# Patient Record
Sex: FEMALE | Race: WHITE | HISPANIC OR LATINO | Employment: UNEMPLOYED | URBAN - METROPOLITAN AREA
[De-identification: names, ages, dates, MRNs, and addresses within clinical notes are randomized per-mention and may not be internally consistent; named-entity substitution may affect disease eponyms.]

---

## 2023-06-27 LAB
ALANINE AMINOTRANSFERASE (SGPT) (U/L) IN SER/PLAS: 16 U/L (ref 7–45)
ALBUMIN (G/DL) IN SER/PLAS: 4.5 G/DL (ref 3.4–5)
ALKALINE PHOSPHATASE (U/L) IN SER/PLAS: 89 U/L (ref 33–110)
ASPARTATE AMINOTRANSFERASE (SGOT) (U/L) IN SER/PLAS: 17 U/L (ref 9–39)
BASOPHILS (10*3/UL) IN BLOOD BY AUTOMATED COUNT: 0.06 X10E9/L (ref 0–0.1)
BASOPHILS/100 LEUKOCYTES IN BLOOD BY AUTOMATED COUNT: 0.7 % (ref 0–2)
BILIRUBIN DIRECT (MG/DL) IN SER/PLAS: 0.1 MG/DL (ref 0–0.3)
BILIRUBIN TOTAL (MG/DL) IN SER/PLAS: 0.2 MG/DL (ref 0–1.2)
C REACTIVE PROTEIN (MG/L) IN SER/PLAS: 1.39 MG/DL
COMPLEMENT C3 (MG/DL) IN SER/PLAS: 156 MG/DL (ref 87–200)
COMPLEMENT C4 (MG/DL) IN SER/PLAS: 33 MG/DL (ref 10–50)
CREATINE KINASE (U/L) IN SER/PLAS: 72 U/L (ref 0–215)
CREATININE (MG/DL) IN SER/PLAS: 0.67 MG/DL (ref 0.5–1.05)
EOSINOPHILS (10*3/UL) IN BLOOD BY AUTOMATED COUNT: 0.25 X10E9/L (ref 0–0.7)
EOSINOPHILS/100 LEUKOCYTES IN BLOOD BY AUTOMATED COUNT: 3.1 % (ref 0–6)
ERYTHROCYTE DISTRIBUTION WIDTH (RATIO) BY AUTOMATED COUNT: 12.4 % (ref 11.5–14.5)
ERYTHROCYTE MEAN CORPUSCULAR HEMOGLOBIN CONCENTRATION (G/DL) BY AUTOMATED: 32.6 G/DL (ref 32–36)
ERYTHROCYTE MEAN CORPUSCULAR VOLUME (FL) BY AUTOMATED COUNT: 94 FL (ref 80–100)
ERYTHROCYTES (10*6/UL) IN BLOOD BY AUTOMATED COUNT: 4.07 X10E12/L (ref 4–5.2)
GFR FEMALE: >90 ML/MIN/1.73M2
HEMATOCRIT (%) IN BLOOD BY AUTOMATED COUNT: 38.4 % (ref 36–46)
HEMOGLOBIN (G/DL) IN BLOOD: 12.5 G/DL (ref 12–16)
IMMATURE GRANULOCYTES/100 LEUKOCYTES IN BLOOD BY AUTOMATED COUNT: 0.2 % (ref 0–0.9)
LEUKOCYTES (10*3/UL) IN BLOOD BY AUTOMATED COUNT: 8.1 X10E9/L (ref 4.4–11.3)
LYMPHOCYTES (10*3/UL) IN BLOOD BY AUTOMATED COUNT: 1.75 X10E9/L (ref 1.2–4.8)
LYMPHOCYTES/100 LEUKOCYTES IN BLOOD BY AUTOMATED COUNT: 21.7 % (ref 13–44)
MONOCYTES (10*3/UL) IN BLOOD BY AUTOMATED COUNT: 0.54 X10E9/L (ref 0.1–1)
MONOCYTES/100 LEUKOCYTES IN BLOOD BY AUTOMATED COUNT: 6.7 % (ref 2–10)
NEUTROPHILS (10*3/UL) IN BLOOD BY AUTOMATED COUNT: 5.46 X10E9/L (ref 1.2–7.7)
NEUTROPHILS/100 LEUKOCYTES IN BLOOD BY AUTOMATED COUNT: 67.6 % (ref 40–80)
PLATELETS (10*3/UL) IN BLOOD AUTOMATED COUNT: 342 X10E9/L (ref 150–450)
PROTEIN TOTAL: 7.6 G/DL (ref 6.4–8.2)
SEDIMENTATION RATE, ERYTHROCYTE: 31 MM/H (ref 0–30)
UREA NITROGEN (MG/DL) IN SER/PLAS: 13 MG/DL (ref 6–23)

## 2023-10-11 PROBLEM — K29.40: Status: RESOLVED | Noted: 2023-10-11 | Resolved: 2023-10-11

## 2023-10-11 PROBLEM — K21.9 GASTROESOPHAGEAL REFLUX DISEASE WITHOUT ESOPHAGITIS: Chronic | Status: ACTIVE | Noted: 2019-09-18

## 2023-10-11 PROBLEM — R10.13 EPIGASTRIC PAIN: Status: ACTIVE | Noted: 2023-10-11

## 2023-10-11 PROBLEM — F41.9 ANXIETY AND DEPRESSION: Status: ACTIVE | Noted: 2023-10-11

## 2023-10-11 PROBLEM — F32.A ANXIETY AND DEPRESSION: Status: ACTIVE | Noted: 2023-10-11

## 2023-10-11 PROBLEM — I10 BENIGN HYPERTENSION: Status: ACTIVE | Noted: 2023-10-11

## 2023-10-11 PROBLEM — K29.40: Status: ACTIVE | Noted: 2023-10-11

## 2023-10-11 PROBLEM — K44.9 HIATAL HERNIA: Status: ACTIVE | Noted: 2023-10-11

## 2023-10-11 RX ORDER — PANTOPRAZOLE SODIUM 40 MG/1
TABLET, DELAYED RELEASE ORAL 2 TIMES DAILY
COMMUNITY
Start: 2020-04-06

## 2023-10-11 RX ORDER — SUCRALFATE 1 G/1
TABLET ORAL 4 TIMES DAILY
COMMUNITY
Start: 2020-05-01

## 2023-10-11 RX ORDER — ASPIRIN 81 MG/1
TABLET ORAL
COMMUNITY
Start: 2019-09-18

## 2023-10-11 RX ORDER — AMLODIPINE BESYLATE 10 MG/1
10 TABLET ORAL DAILY
COMMUNITY

## 2023-10-11 NOTE — PROGRESS NOTES
GENERAL SURGERY/TRAUMA  HISTORY AND PHYSICAL/CONSULT    Date: 10/11/2023 Time: 8:42 AM    Name: Sonja Carrera  MRN: [id number]    This is a 52 y.o. female presenting virtually today for follow up hiatal hernia with chronic GERD. Last seen by me 11/13/2020, at that time patient was advised to call the office if wanting to proceed with surgical intervention vs medical management. Patient had a recent follow up with GI specialist, Dr. Neida Taylor and was referred back to discuss proceeding with hiatal hernia repair for ongoing symptoms with use of omeprazole and carafate daily.     HPI from 11/13/2020:   Discussed her results at length   has small sliding hiatal hernia   pH study off meds positive for moderate reflux and sxs correlation   EGD c/w small hiatal hernia   manometry with good esophageal motility       CT negative for intraabdominal pathology to explain right sided subcostal pain, HIDA scan negative , US negative for gallstones       She continues to have GERD despite pantoprazole, Carafate and Pepto-Bismol, although meds help with sxs       I discussed continuing medical management vs fixing hiatal hernia       TIF and Lap hiatal hernia repair with fundoplication discussed       would favor TIF in light of only small sliding hiatal hernia and GERD       explained atypical sxs or sxs unrelated to hiatal hernia may and most likely will persist       she is getting cardiology assessment in Dec.       She will call us if she wants to proceed with any of interventions after she has discussed it with her  and has cardiology assessment done.           PAST MEDICAL HISTORY:  Past Medical History:   Diagnosis Date    AG (atrophic gastritis)     Chronic epigastric pain     Chronic gastroesophageal reflux disease     Hiatal hernia with GERD     HTN (hypertension)         PAST SURGICAL HISTORY:  No past surgical history on file.    FAMILY HISTORY:  No family history on file.     SOCIAL HISTORY:  Social History      Tobacco Use    Smoking status: Never    Smokeless tobacco: Never   Substance Use Topics    Alcohol use: Not Currently    Drug use: Never       MEDICATIONS:  Prior to Admission Medications:    Current Outpatient Medications:     amLODIPine (Norvasc) 10 mg tablet, Take 1 tablet (10 mg) by mouth once daily., Disp: , Rfl:     aspirin 81 mg EC tablet, Take by mouth., Disp: , Rfl:     pantoprazole (ProtoNix) 40 mg EC tablet, Take by mouth twice a day., Disp: , Rfl:     sucralfate (Carafate) 1 gram tablet, Take by mouth 4 times a day., Disp: , Rfl:     ALLERGIES:  Not on File    REVIEW OF SYSTEMS:  GENERAL: Negative for malaise, significant weight loss and fever  NECK: Negative for lumps, goiter, pain and significant neck swelling  RESPIRATORY: Negative for cough, wheezing or shortness of breath.  CARDIOVASCULAR: Negative for chest pain, leg swelling or palpitations.  GI: Negative for abdominal discomfort, blood in stools or black stools or change in bowel habits  : No history of dysuria, frequency or incontinence  MUSCULOSKELETAL: Negative for joint pain or swelling, back pain or muscle pain.  SKIN: Negative for lesions, rash, and itching.  PSYCH: Negative for sleep disturbance, mood disorder and recent psychosocial stressors.  ENDOCRINE: Negative for cold or heat intolerance, polyuria, polydipsia and goiter.    PHYSICAL EXAM:  There were no vitals taken for this visit.  Virtual visit  IMPRESSION:  Sonja Carrera is a 52 y.o. female with GERD, hiatal hernia    PLAN:    Previous assessment was in 2020  At that time patient had a small sliding hiatal hernia and significant reflux issues however there is no recent endoscopy or imaging studies which I can evaluate the size of the hernia.  We will order an upper GI study to assess the size of hiatal hernia and reflux as    She in my opinion still is a good candidate for TIF, endoscopic fundoplication unless imaging studies show a larger hiatal hernia in that case we may  consider laparoscopic hiatal hernia repair and fundoplication.  Patient understands it and wants to proceed with it as her symptoms are making her quality of life.  We will schedule for endoscopic/laparoscopic fundoplication depending on insurance approval.      The risks of the procedure including bleeding, infection, injury to neighboring organ, prolonged hospitalization, need for further procedure and death have been explained to the patient and Sonja Carrera has expressed understanding and acceptance of them. Consent has been signed.    Elisabeth Clifton MD  Bariatric and Minimally Invasive General Surgery

## 2023-10-12 ENCOUNTER — TELEMEDICINE (OUTPATIENT)
Dept: SURGERY | Facility: CLINIC | Age: 53
End: 2023-10-12
Payer: COMMERCIAL

## 2023-10-12 ENCOUNTER — TELEPHONE (OUTPATIENT)
Dept: SURGERY | Facility: CLINIC | Age: 53
End: 2023-10-12

## 2023-10-12 DIAGNOSIS — K44.9 HIATAL HERNIA: ICD-10-CM

## 2023-10-12 DIAGNOSIS — R10.13 EPIGASTRIC PAIN: ICD-10-CM

## 2023-10-12 DIAGNOSIS — K21.9 GASTROESOPHAGEAL REFLUX DISEASE WITHOUT ESOPHAGITIS: Chronic | ICD-10-CM

## 2023-10-12 DIAGNOSIS — K44.9 HIATAL HERNIA: Primary | ICD-10-CM

## 2023-10-12 PROCEDURE — 99213 OFFICE O/P EST LOW 20 MIN: CPT | Mod: 95 | Performed by: SURGERY

## 2023-10-12 PROCEDURE — 99213 OFFICE O/P EST LOW 20 MIN: CPT | Performed by: SURGERY

## 2023-10-12 RX ORDER — OXYBUTYNIN CHLORIDE 5 MG/1
5 TABLET, EXTENDED RELEASE ORAL DAILY
COMMUNITY
Start: 2023-03-07 | End: 2023-12-22 | Stop reason: ALTCHOICE

## 2023-10-12 RX ORDER — CYCLOBENZAPRINE HCL 10 MG
1 TABLET ORAL NIGHTLY
Status: ON HOLD | COMMUNITY
Start: 2023-07-09 | End: 2024-01-03 | Stop reason: ALTCHOICE

## 2023-10-12 RX ORDER — ALBUTEROL SULFATE 90 UG/1
AEROSOL, METERED RESPIRATORY (INHALATION)
COMMUNITY
Start: 2022-11-19

## 2023-10-12 RX ORDER — TERBINAFINE HYDROCHLORIDE 250 MG/1
250 TABLET ORAL DAILY
COMMUNITY
Start: 2023-06-05 | End: 2023-12-22 | Stop reason: ALTCHOICE

## 2023-10-12 RX ORDER — CYCLOBENZAPRINE HCL 10 MG
10 TABLET ORAL NIGHTLY
COMMUNITY
Start: 2023-09-24

## 2023-10-12 NOTE — TELEPHONE ENCOUNTER
Spoke with patient.  Sent the following follow up email:    Wolfgang Casillas!    Thank you so much for chatting with me today! As a reminder, please call our office at [phone] to let us know when you go for your x-ray.  The number for radiology scheduling is [phone].      Please sign the attached HIPAA Authorization so we can start the process to see if the TIF procedure is a covered benefit on your insurance plan.  This, with the information obtained from the x-ray, will let us know in which direction we can proceed for your procedure.     If you have any questions please do not hesitate to contact our office at the number above or via [employer]!     Thank you!   [facility]

## 2023-10-19 ENCOUNTER — HOSPITAL ENCOUNTER (OUTPATIENT)
Dept: RADIOLOGY | Facility: HOSPITAL | Age: 53
Discharge: HOME | End: 2023-10-19
Payer: COMMERCIAL

## 2023-10-19 DIAGNOSIS — K21.9 GASTROESOPHAGEAL REFLUX DISEASE WITHOUT ESOPHAGITIS: Chronic | ICD-10-CM

## 2023-10-19 DIAGNOSIS — K44.9 HIATAL HERNIA: ICD-10-CM

## 2023-10-19 PROCEDURE — 3430000001 HC RX 343 DIAGNOSTIC RADIOPHARMACEUTICALS: Performed by: SURGERY

## 2023-10-19 PROCEDURE — 2500000001 HC RX 250 WO HCPCS SELF ADMINISTERED DRUGS (ALT 637 FOR MEDICARE OP): Performed by: SURGERY

## 2023-10-19 PROCEDURE — 74246 X-RAY XM UPR GI TRC 2CNTRST: CPT | Performed by: RADIOLOGY

## 2023-10-19 PROCEDURE — 74240 X-RAY XM UPR GI TRC 1CNTRST: CPT

## 2023-10-19 RX ADMIN — BARIUM SULFATE 100 ML: 980 POWDER, FOR SUSPENSION ORAL at 09:09

## 2023-10-19 RX ADMIN — ANTACID/ANTIFLATULENT 1 PACKET: 380; 550; 10; 10 GRANULE, EFFERVESCENT ORAL at 09:10

## 2023-10-20 ENCOUNTER — TELEPHONE (OUTPATIENT)
Dept: SURGERY | Facility: CLINIC | Age: 53
End: 2023-10-20
Payer: COMMERCIAL

## 2023-10-20 NOTE — TELEPHONE ENCOUNTER
Left a message for the patient letting her know Dr. Clifton feels she should proceed with hiatal hernia repair with fundoplication either laparoscopic or via endoscope.  Explained to patient briefly the difference between the two and that we would need her to sign a HIPAA release form to proceed with financial clearance for the endoscopic route.  Asked patient to call me back to let me know which way she would like to proceed         Patient returned call and would like to proceed with the TIF if covered by insurance.  Email to patient with HIPAA auth form.     Hi Sonja!     As discussed here is the HIPAA form and an overview of the patient support program!    Please sign and return to me at your earliest convenience!     Thank you,   [facility] Olivas, YUMIN, RN   Assistant Nurse Manager   Bariatric and Metabolic Surgery   [address] [place] [address]   P: [phone] F: [phone]  Available via [employer] Secure Chat

## 2023-11-30 DIAGNOSIS — K21.00 GASTROESOPHAGEAL REFLUX DISEASE WITH ESOPHAGITIS WITHOUT HEMORRHAGE: Primary | ICD-10-CM

## 2023-12-04 PROBLEM — K21.00 GASTROESOPHAGEAL REFLUX DISEASE WITH ESOPHAGITIS WITHOUT HEMORRHAGE: Status: ACTIVE | Noted: 2023-11-30

## 2023-12-05 ENCOUNTER — HOSPITAL ENCOUNTER (OUTPATIENT)
Dept: CARDIOLOGY | Facility: HOSPITAL | Age: 53
Discharge: HOME | End: 2023-12-05
Payer: COMMERCIAL

## 2023-12-05 ENCOUNTER — HOSPITAL ENCOUNTER (OUTPATIENT)
Dept: RADIOLOGY | Facility: HOSPITAL | Age: 53
Discharge: HOME | End: 2023-12-05
Payer: COMMERCIAL

## 2023-12-05 DIAGNOSIS — K21.00 GASTROESOPHAGEAL REFLUX DISEASE WITH ESOPHAGITIS WITHOUT HEMORRHAGE: ICD-10-CM

## 2023-12-05 PROCEDURE — 93005 ELECTROCARDIOGRAM TRACING: CPT

## 2023-12-05 PROCEDURE — 71045 X-RAY EXAM CHEST 1 VIEW: CPT | Performed by: RADIOLOGY

## 2023-12-05 PROCEDURE — 71045 X-RAY EXAM CHEST 1 VIEW: CPT

## 2023-12-05 PROCEDURE — 93010 ELECTROCARDIOGRAM REPORT: CPT | Performed by: INTERNAL MEDICINE

## 2023-12-06 LAB
ATRIAL RATE: 71 BPM
P AXIS: 62 DEGREES
P OFFSET: 194 MS
P ONSET: 139 MS
PR INTERVAL: 160 MS
Q ONSET: 219 MS
QRS COUNT: 12 BEATS
QRS DURATION: 76 MS
QT INTERVAL: 392 MS
QTC CALCULATION(BAZETT): 425 MS
QTC FREDERICIA: 414 MS
R AXIS: 1 DEGREES
T AXIS: 57 DEGREES
T OFFSET: 415 MS
VENTRICULAR RATE: 71 BPM

## 2023-12-18 NOTE — H&P (VIEW-ONLY)
GENERAL SURGERY CLINIC  FOLLOW UP NOTE      Name: Sonja Carrera  MRN: [id number]      Index Surgery  Date of Surgery: 1/ 3/24  Surgeon: Dr. Elisabeth Clifton    Surgical Procedure: Hiatal hernia repair 54452    HPI: 53 year old female presenting today for a final pre op visit in advance of her hiatal hernia repair scheduled 1/3/24.      REVIEW OF SYSTEMS:  CONSTITUTIONAL: Patient denies fevers, chills, sweats and weight changes.  CARDIOVASCULAR: Patient denies chest pains, palpitations, orthopnea and paroxysmal nocturnal dyspnea.  RESPIRATORY: No dyspnea on exertion, no wheezing or cough.  GI: No nausea, vomiting, diarrhea, constipation, abdominal pain, hematochezia or melena.  MUSCULOSKELETAL: No myalgias or arthralgias.  NEUROLOGIC: No chronic headaches, no seizures. Patient denies numbness, tingling or weakness.  PSYCHIATRIC: Patient denies problems with mood disturbance. No problems with anxiety.  ENDOCRINE: No excessive urination or excessive thirst.  DERMATOLOGIC: Patient denies any rashes or skin changes.    PHYSICAL EXAM:  There were no vitals taken for this visit.  Alert, well appearing, no acute distress, nourished, hydrated.  Anicteric sclera, no ptosis  Facial symmetry  Neck supple  Unlabored respirations.  Easily conversant without increased respiratory effort  Oriented to person, place, time.  Judgement intact.  Appropriate mood, affect.       ASSESSMENT & PLAN:  53 y.o. female presenting for a final pre op visit for a HHR ,fundoplication and associated procedures scheduled 1/3/24    Patient is suffering from reflux  Extensive work-up has been done  Endoscopy is consistent with small hiatal hernia  Insurance refused TIF, Patient is interested in proceeding with surgical fix  Hiatal hernia repair with fundoplication discussed  Risk of bleeding, leak, perioperative cardiopulmonary complication, dysphagia, GERD, worsening symptoms, recurrent symptoms, recurrent hernia, wrap migration, need for endoscopic  interventions, need for revisions in future, vagus nerve injury, diarrhea, bloating, inability to belch or vomit etc. were discussed all questions were answered.  Patient wants to proceed.  Complete versus partial fundoplication discussed and will decide intraoperatively.      Will proceed as planned.

## 2023-12-18 NOTE — PATIENT INSTRUCTIONS
You are scheduled for a: Hiatal Hernia Repair  with Dr. Clifton on 1/3/2024    YOU DO NOT NEED TO DO A BOWEL PREP.  You will be on clear liquids only starting the day prior to surgery. Please refer to your final pre op book/RD instructions.     You will receive a phone call the day prior to surgery with your OR arrival time.  Be sure to read over your Pre-Op book for final preparation for surgery.  Make a shopping list and  your supplements prior to surgery.     Prescriptions for Omeprazole (antacid), Oxycodone (pain),  and Ondansetron (anti-Nausea) have been sent to your retail pharmacy. Pick these up if you have not yet done so - these are for after surgery.      Call with any questions! [phone] for [facility].

## 2023-12-18 NOTE — PROGRESS NOTES
GENERAL SURGERY CLINIC  FOLLOW UP NOTE      Name: Sonja Carrera  MRN: [id number]      Index Surgery  Date of Surgery: 1/ 3/24  Surgeon: Dr. Elisabeth Clifton    Surgical Procedure: Hiatal hernia repair 11419    HPI: 53 year old female presenting today for a final pre op visit in advance of her hiatal hernia repair scheduled 1/3/24.      REVIEW OF SYSTEMS:  CONSTITUTIONAL: Patient denies fevers, chills, sweats and weight changes.  CARDIOVASCULAR: Patient denies chest pains, palpitations, orthopnea and paroxysmal nocturnal dyspnea.  RESPIRATORY: No dyspnea on exertion, no wheezing or cough.  GI: No nausea, vomiting, diarrhea, constipation, abdominal pain, hematochezia or melena.  MUSCULOSKELETAL: No myalgias or arthralgias.  NEUROLOGIC: No chronic headaches, no seizures. Patient denies numbness, tingling or weakness.  PSYCHIATRIC: Patient denies problems with mood disturbance. No problems with anxiety.  ENDOCRINE: No excessive urination or excessive thirst.  DERMATOLOGIC: Patient denies any rashes or skin changes.    PHYSICAL EXAM:  There were no vitals taken for this visit.  Alert, well appearing, no acute distress, nourished, hydrated.  Anicteric sclera, no ptosis  Facial symmetry  Neck supple  Unlabored respirations.  Easily conversant without increased respiratory effort  Oriented to person, place, time.  Judgement intact.  Appropriate mood, affect.       ASSESSMENT & PLAN:  53 y.o. female presenting for a final pre op visit for a HHR ,fundoplication and associated procedures scheduled 1/3/24    Patient is suffering from reflux  Extensive work-up has been done  Endoscopy is consistent with small hiatal hernia  Insurance refused TIF, Patient is interested in proceeding with surgical fix  Hiatal hernia repair with fundoplication discussed  Risk of bleeding, leak, perioperative cardiopulmonary complication, dysphagia, GERD, worsening symptoms, recurrent symptoms, recurrent hernia, wrap migration, need for endoscopic  interventions, need for revisions in future, vagus nerve injury, diarrhea, bloating, inability to belch or vomit etc. were discussed all questions were answered.  Patient wants to proceed.  Complete versus partial fundoplication discussed and will decide intraoperatively.      Will proceed as planned.

## 2023-12-22 ENCOUNTER — OFFICE VISIT (OUTPATIENT)
Dept: SURGERY | Facility: CLINIC | Age: 53
End: 2023-12-22
Payer: COMMERCIAL

## 2023-12-22 ENCOUNTER — PRE-ADMISSION TESTING (OUTPATIENT)
Dept: PREADMISSION TESTING | Facility: HOSPITAL | Age: 53
End: 2023-12-22
Payer: COMMERCIAL

## 2023-12-22 ENCOUNTER — NUTRITION (OUTPATIENT)
Dept: SURGERY | Facility: CLINIC | Age: 53
End: 2023-12-22
Payer: COMMERCIAL

## 2023-12-22 VITALS
DIASTOLIC BLOOD PRESSURE: 79 MMHG | OXYGEN SATURATION: 100 % | HEIGHT: 63 IN | WEIGHT: 166 LBS | SYSTOLIC BLOOD PRESSURE: 127 MMHG | HEART RATE: 79 BPM | BODY MASS INDEX: 29.41 KG/M2

## 2023-12-22 VITALS
SYSTOLIC BLOOD PRESSURE: 146 MMHG | DIASTOLIC BLOOD PRESSURE: 84 MMHG | WEIGHT: 166.45 LBS | HEIGHT: 63 IN | RESPIRATION RATE: 20 BRPM | OXYGEN SATURATION: 99 % | TEMPERATURE: 95.5 F | HEART RATE: 78 BPM | BODY MASS INDEX: 29.49 KG/M2

## 2023-12-22 DIAGNOSIS — K21.00 GASTROESOPHAGEAL REFLUX DISEASE WITH ESOPHAGITIS WITHOUT HEMORRHAGE: ICD-10-CM

## 2023-12-22 DIAGNOSIS — G89.18 POST-OP PAIN: ICD-10-CM

## 2023-12-22 DIAGNOSIS — K21.9 GASTROESOPHAGEAL REFLUX DISEASE WITHOUT ESOPHAGITIS: Chronic | ICD-10-CM

## 2023-12-22 DIAGNOSIS — K44.9 HIATAL HERNIA: Primary | ICD-10-CM

## 2023-12-22 DIAGNOSIS — Z98.890 POST-OPERATIVE NAUSEA AND VOMITING: ICD-10-CM

## 2023-12-22 DIAGNOSIS — R11.2 POST-OPERATIVE NAUSEA AND VOMITING: ICD-10-CM

## 2023-12-22 DIAGNOSIS — R10.13 EPIGASTRIC PAIN: ICD-10-CM

## 2023-12-22 LAB
ABO GROUP (TYPE) IN BLOOD: NORMAL
ALBUMIN SERPL BCP-MCNC: 4.5 G/DL (ref 3.4–5)
ALP SERPL-CCNC: 84 U/L (ref 33–110)
ALT SERPL W P-5'-P-CCNC: 23 U/L (ref 7–45)
ANION GAP SERPL CALC-SCNC: 12 MMOL/L (ref 10–20)
ANTIBODY SCREEN: NORMAL
AST SERPL W P-5'-P-CCNC: 18 U/L (ref 9–39)
BILIRUB SERPL-MCNC: 0.3 MG/DL (ref 0–1.2)
BUN SERPL-MCNC: 14 MG/DL (ref 6–23)
CALCIUM SERPL-MCNC: 9.5 MG/DL (ref 8.6–10.3)
CHLORIDE SERPL-SCNC: 103 MMOL/L (ref 98–107)
CO2 SERPL-SCNC: 28 MMOL/L (ref 21–32)
CREAT SERPL-MCNC: 0.83 MG/DL (ref 0.5–1.05)
ERYTHROCYTE [DISTWIDTH] IN BLOOD BY AUTOMATED COUNT: 12.9 % (ref 11.5–14.5)
GFR SERPL CREATININE-BSD FRML MDRD: 83 ML/MIN/1.73M*2
GLUCOSE SERPL-MCNC: 124 MG/DL (ref 74–99)
HCT VFR BLD AUTO: 39.1 % (ref 36–46)
HGB BLD-MCNC: 12.3 G/DL (ref 12–16)
INR PPP: 1 (ref 0.9–1.1)
MCH RBC QN AUTO: 30.5 PG (ref 26–34)
MCHC RBC AUTO-ENTMCNC: 31.5 G/DL (ref 32–36)
MCV RBC AUTO: 97 FL (ref 80–100)
NRBC BLD-RTO: 0 /100 WBCS (ref 0–0)
PLATELET # BLD AUTO: 370 X10*3/UL (ref 150–450)
POTASSIUM SERPL-SCNC: 4.4 MMOL/L (ref 3.5–5.3)
PROT SERPL-MCNC: 7 G/DL (ref 6.4–8.2)
PROTHROMBIN TIME: 11.5 SECONDS (ref 9.8–12.8)
RBC # BLD AUTO: 4.03 X10*6/UL (ref 4–5.2)
RH FACTOR (ANTIGEN D): NORMAL
SODIUM SERPL-SCNC: 139 MMOL/L (ref 136–145)
WBC # BLD AUTO: 8.5 X10*3/UL (ref 4.4–11.3)

## 2023-12-22 PROCEDURE — 85610 PROTHROMBIN TIME: CPT

## 2023-12-22 PROCEDURE — 99214 OFFICE O/P EST MOD 30 MIN: CPT | Performed by: SURGERY

## 2023-12-22 PROCEDURE — 3078F DIAST BP <80 MM HG: CPT | Performed by: SURGERY

## 2023-12-22 PROCEDURE — 80053 COMPREHEN METABOLIC PANEL: CPT

## 2023-12-22 PROCEDURE — 99203 OFFICE O/P NEW LOW 30 MIN: CPT | Performed by: NURSE PRACTITIONER

## 2023-12-22 PROCEDURE — 36415 COLL VENOUS BLD VENIPUNCTURE: CPT

## 2023-12-22 PROCEDURE — 85027 COMPLETE CBC AUTOMATED: CPT

## 2023-12-22 PROCEDURE — 1036F TOBACCO NON-USER: CPT | Performed by: SURGERY

## 2023-12-22 PROCEDURE — 3074F SYST BP LT 130 MM HG: CPT | Performed by: SURGERY

## 2023-12-22 PROCEDURE — 86900 BLOOD TYPING SEROLOGIC ABO: CPT

## 2023-12-22 RX ORDER — ONDANSETRON 4 MG/1
4 TABLET, ORALLY DISINTEGRATING ORAL EVERY 6 HOURS PRN
Qty: 60 TABLET | Refills: 1 | Status: SHIPPED | OUTPATIENT
Start: 2023-12-22

## 2023-12-22 RX ORDER — OXYCODONE HCL 5 MG/5 ML
5 SOLUTION, ORAL ORAL EVERY 6 HOURS PRN
Qty: 140 ML | Refills: 0 | Status: SHIPPED | OUTPATIENT
Start: 2023-12-22 | End: 2024-01-04 | Stop reason: HOSPADM

## 2023-12-22 RX ORDER — OMEPRAZOLE 40 MG/1
40 CAPSULE, DELAYED RELEASE ORAL
Qty: 30 CAPSULE | Refills: 0 | Status: SHIPPED | OUTPATIENT
Start: 2023-12-22 | End: 2024-01-21

## 2023-12-22 ASSESSMENT — DUKE ACTIVITY SCORE INDEX (DASI)
CAN YOU DO HEAVY WORK AROUND THE HOUSE LIKE SCRUBBING FLOORS OR LIFTING AND MOVING HEAVY FURNITURE: YES
CAN YOU DO LIGHT WORK AROUND THE HOUSE LIKE DUSTING OR WASHING DISHES: YES
CAN YOU RUN A SHORT DISTANCE: YES
CAN YOU DO YARD WORK LIKE RAKING LEAVES, WEEDING OR PUSHING A MOWER: YES
CAN YOU PARTICIPATE IN STRENOUS SPORTS LIKE SWIMMING, SINGLES TENNIS, FOOTBALL, BASKETBALL, OR SKIING: NO
DASI METS SCORE: 8.2
CAN YOU WALK INDOORS, SUCH AS AROUND YOUR HOUSE: YES
TOTAL_SCORE: 44.7
CAN YOU WALK A BLOCK OR TWO ON LEVEL GROUND: YES
CAN YOU PARTICIPATE IN MODERATE RECREATIONAL ACTIVITIES LIKE GOLF, BOWLING, DANCING, DOUBLES TENNIS OR THROWING A BASEBALL OR FOOTBALL: NO
CAN YOU HAVE SEXUAL RELATIONS: YES
CAN YOU DO MODERATE WORK AROUND THE HOUSE LIKE VACUUMING, SWEEPING FLOORS OR CARRYING GROCERIES: YES
CAN YOU TAKE CARE OF YOURSELF (EAT, DRESS, BATHE, OR USE TOILET): YES
CAN YOU CLIMB A FLIGHT OF STAIRS OR WALK UP A HILL: YES

## 2023-12-22 ASSESSMENT — PAIN SCALES - GENERAL: PAINLEVEL_OUTOF10: 0 - NO PAIN

## 2023-12-22 ASSESSMENT — PAIN - FUNCTIONAL ASSESSMENT: PAIN_FUNCTIONAL_ASSESSMENT: 0-10

## 2023-12-22 NOTE — PREPROCEDURE INSTRUCTIONS
Medication List            Accurate as of December 22, 2023  2:22 PM. Always use your most recent med list.                albuterol 90 mcg/actuation inhaler  Medication Adjustments for Surgery: Take morning of surgery with sip of water, no other fluids     amLODIPine 10 mg tablet  Commonly known as: Norvasc  Medication Adjustments for Surgery: Take morning of surgery with sip of water, no other fluids     aspirin 81 mg EC tablet  Medication Adjustments for Surgery: Continue until night before surgery     * cyclobenzaprine 10 mg tablet  Commonly known as: Flexeril  Medication Adjustments for Surgery: Other (Comment)     * cyclobenzaprine 10 mg tablet  Commonly known as: Flexeril  Medication Adjustments for Surgery: Other (Comment)     omeprazole 40 mg DR capsule  Commonly known as: PriLOSEC  Take 1 capsule (40 mg) by mouth once daily in the morning. Take before meals. Do not crush or chew. Open capsule, sprinkle beads on SF jello, pudding or applesauce.  Medication Adjustments for Surgery: Other (Comment)     ondansetron ODT 4 mg disintegrating tablet  Commonly known as: Zofran-ODT  Take 1 tablet (4 mg) by mouth every 6 hours if needed for nausea or vomiting.  Medication Adjustments for Surgery: Other (Comment)     oxyCODONE 5 mg/5 mL solution  Commonly known as: Roxicodone  Take 5 mL (5 mg) by mouth every 6 hours if needed for severe pain (7 - 10) or moderate pain (4 - 6) for up to 7 days.  Medication Adjustments for Surgery: Other (Comment)     pantoprazole 40 mg EC tablet  Commonly known as: ProtoNix  Medication Adjustments for Surgery: Continue until night before surgery     sucralfate 1 gram tablet  Commonly known as: Carafate  Medication Adjustments for Surgery: Continue until night before surgery           * This list has 2 medication(s) that are the same as other medications prescribed for you. Read the directions carefully, and ask your doctor or other care provider to review them with you.                                   NPO Instructions:    Do not eat any food after midnight the night before your surgery/procedure.    Additional Instructions:     Day of Surgery:  Wear  comfortable loose fitting clothing  Do not use moisturizers, creams, lotions or perfume  All jewelry and valuables should be left at home        PRE-OPERATIVE INSTRUCTIONS FOR SURGERY    *Do not eat anything after midnight the night of surgery.  This includes food of any kind (including hard candy, cough drops, mints and gum) and beverages (including coffee and soda).  You may drink up to one 8 ounce glass of water up until three hours before your scheduled surgery time.    *One of our staff members will call you ONE business day before your surgery, between 11a-2p  To let you know the time to arrive.  If you have no received a call by 2 pm, call [phone]  *When you arrive at the hospital-->GO TO Registration on the ground floor  *Stop smoking 24 hours prior to surgery.  No Marijuana, CBD Oil or Vaping for 48 hours  *No alcohol 24 hours prior to surgery  *You will need a responsible adult to drive you home  -No acrylic nails or nail polish on at least one fingernail, NO polish on toes for foot surgery  -You may be asked to remove your dentures, partial plate, eyeglasses or contact lenses before going to surgery.  Please bring a case for these items.  -Body piercings need to be removed.  Jewelry and valuables should be left at home.  -Put on loose,  comfortable, clean clothing, that will accommodate bandages    HOME PREOPERATIVE ANTIBACTERIAL SHOWER:  -This shower is a way of cleaning the skin with germ killing solution before surgery.  The solution contains Chorhexidine (CHG).   Let your DrMike Know if you are allergic to Chlorhexidine.    NIGHT BEFORE SURGERY:  IF you are having a TOTAL joint replacement- YOU WILL SHOWER 2 NIGHTS PRIOR to surgery    -Take a normal shower and wash your hair  -Turn OFF water to avoid rinsing the antibacterial  skin cleanser off (CHG).  -Apply the CHG cleanser to a clean and wet washcloth.  Lather your entire body from the neck down.    -DO NOT USE ON THE HEAD, FACE, or GENITALS.  -RINSE immediately if CHG is in contact with your eyes, ears or mouth  -Gently wash your body.  Have the CHG cleanser stay on your skin for 3 MINUTES.  -After 3 minutes, turn on water and rinse the CHG cleanser off your body completely  -DO NOT WASH with regular soap after using CHG.  -PAT DRY with a clean fresh towel  -DO NOT apply any love, deodorants or lotions  -Dress in clean night cloths  **CHG cleanser will cause stains to fabrics if you wash them with bleach after use.     DAY OF SURGERY:  -Take shower-->JUST GET WET.  Turn OFF water.  -REPEAT the CHG cleanse as you did the night before.  -PAT DRY-->    What you may be asked to bring to surgery:  ___Crutches, walker  ___CPAP machine  ___Urine specimen

## 2023-12-22 NOTE — CPM/PAT H&P
"CPM/PAT Evaluation       Name: Sonja Carrera (Sonja Carrera)  /Age: 1970/53 y.o.     In-Person       Chief Complaint: Hiatal hernia    53 yr old female with c/o hiatal hernia.  Reports ongoing years long progressive acid reflux symptoms including severe heartburn before and after meals.  Reports nausea with some 'puke burps' feeling acid up in mouth which happens \"quite frequently\".  Had tried medications, diet and position changes (sitting upright even with sleep) with no lasting relief.  Used to enjoy yoga but now cannot do so, due to bending body worsens symptoms.   Sought treatment and found to have hiatal hernia.  Reports remaining otherwise physically active, denies cardiac or respiratory symptoms.  Denies past issues with anesthesia.         Past Medical History:   Diagnosis Date    AG (atrophic gastritis)     Arthritis     Asthma     Chronic epigastric pain     Chronic gastroesophageal reflux disease     Clotting disorder (CMS/HCC)     Hiatal hernia with GERD     HTN (hypertension)     Vision loss        Past Surgical History:   Procedure Laterality Date    BLADDER SURGERY      CARPAL TUNNEL RELEASE Bilateral     COLONOSCOPY      HYSTERECTOMY      UPPER GASTROINTESTINAL ENDOSCOPY         Patient Sexual activity questions deferred to the physician.    Family History   Problem Relation Name Age of Onset    Heart disease Father         Allergies   Allergen Reactions    Gabapentin Unknown     Neck swelling and sore throat    Ibuprofen Unknown     Chest pain    Pregabalin Unknown     Neck swelling and sore throat    Sulfamethoxazole-Trimethoprim Shortness of breath    Amitriptyline Swelling    Duloxetine Hcl Headache    Tizanidine Dizziness    Amoxicillin-Pot Clavulanate Unknown    Egg Yolk Unknown    Tetanus Toxoid Unknown     She can't walk and she also has flu like symptoms.    Tetracycline Unknown    Erythromycin Unknown and Rash    Fluocinolone Rash    Methocarbamol Rash    Wellbutrin [Bupropion Hcl] Rash "       Prior to Admission medications    Medication Sig Start Date End Date Taking? Authorizing Provider   albuterol 90 mcg/actuation inhaler INHALE TWO PUFFS BY MOUTH EVERY 4 HOURS AS NEEDED as instructed for wheezing/shortness of breath 11/19/22   Historical Provider, MD   amLODIPine (Norvasc) 10 mg tablet Take 1 tablet (10 mg) by mouth once daily.    Historical Provider, MD   aspirin 81 mg EC tablet Take by mouth. 9/18/19   Historical Provider, MD   cyclobenzaprine (Flexeril) 10 mg tablet Take 1 tablet (10 mg) by mouth once daily at bedtime. 7/9/23   Historical Provider, MD   cyclobenzaprine (Flexeril) 10 mg tablet Take 1 tablet (10 mg) by mouth once daily at bedtime. 9/24/23   Historical Provider, MD   omeprazole (PriLOSEC) 40 mg DR capsule Take 1 capsule (40 mg) by mouth once daily in the morning. Take before meals. Do not crush or chew. Open capsule, sprinkle beads on SF jello, pudding or applesauce. 12/22/23 1/21/24  Elisabeth Clifton MD   ondansetron ODT (Zofran-ODT) 4 mg disintegrating tablet Take 1 tablet (4 mg) by mouth every 6 hours if needed for nausea or vomiting. 12/22/23   Elisabeth Clifton MD   oxybutynin XL (Ditropan-XL) 5 mg 24 hr tablet Take 1 tablet (5 mg) by mouth once daily. 3/7/23   Historical Provider, MD   oxyCODONE (Roxicodone) 5 mg/5 mL solution Take 5 mL (5 mg) by mouth every 6 hours if needed for severe pain (7 - 10) or moderate pain (4 - 6) for up to 7 days. 12/22/23 12/29/23  Elisabeth Clifton MD   pantoprazole (ProtoNix) 40 mg EC tablet Take by mouth twice a day. 4/6/20   Historical Provider, MD   sucralfate (Carafate) 1 gram tablet Take by mouth 4 times a day. 5/1/20   Historical Provider, MD   terbinafine (LamISIL) 250 mg tablet Take 1 tablet (250 mg) by mouth once daily. 6/5/23   Historical Provider, MD        Review of Systems    Constitutional: no fever, no chills and not feeling poorly.   Eyes: no eyesight problems.   ENT: no hearing loss, no nosebleeds and no sore throat.    Cardiovascular: no chest pain, no palpitations and no extremity edema.   Respiratory: no shortness of breath, no wheezing, no cough and no sob with exertion.   Gastrointestinal: negative for abdominal pain, blood in stools or changes in bowel habits   Genitourinary: negative for dysuria, incontinence or changes in urinary habits   Musculoskeletal: no arthralgias and no gait abnormality.   Integumentary: negative for lesions, rash or itching.   Neurological: negative for confusion, dizziness, fainting or difficulty walking.   Psychiatric: not suicidal, no anxiety and no depression.   All other systems have been reviewed and are negative for complaint.     Physical Exam    General:      No acute distress, AOx3; appropriate mood and eye contact  HENT:      Head normal cephalic; mucous membranes moist & pink  Eyes:      Vision grossly intact, PERRLA, corrective lenses in use  Skin:      Warm, dry & intact, no erythema or rashes noted  Respiratory:      Symmetric chest expansion, CTA, Room air  Cardiovascular:      RRR, S1, S2, no murmurs, rubs or gallops  Abdomen:      Soft, non-tender, BS+  Extremities:      No gross deformities; GIANG x4; normal gait  Psychiatric:      Pleasant & cooperative, appropriate affect    PAT AIRWAY:   Airway:     Mallampati::  II    TM distance::  >3 FB    Neck ROM::  Limited   Permanent Right upper   partials      Visit Vitals  /84   Pulse 78   Temp 35.3 °C (95.5 °F) (Temporal)   Resp 20       DASI Risk Score      Flowsheet Row Most Recent Value   DASI SCORE 44.7   METS Score (Will be calculated only when all the questions are answered) 8.2          Caprini DVT Assessment    No data to display       Modified Frailty Index    No data to display       CHADS2 Stroke Risk  Current as of 4 minutes ago        N/A 3 - 100%: High Risk   2 - 3%: Medium Risk   0 - 2%: Low Risk     Last Change: N/A          This score determines the patient's risk of having a stroke if the patient has atrial  fibrillation.        This score is not applicable to this patient. Components are not calculated.          Revised Cardiac Risk Index    No data to display       Apfel Simplified Score    No data to display       Risk Analysis Index Results This Encounter    No data found in the last 1 encounters.       Stop Bang Score      Flowsheet Row Most Recent Value   Do you snore loudly? 1   Do you often feel tired or fatigued after your sleep? 0   Has anyone ever observed you stop breathing in your sleep? 0   Do you have or are you being treated for high blood pressure? 0   Recent BMI (Calculated) 29.5   Is BMI greater than 35 kg/m2? 0=No   Age older than 50 years old? 1=Yes   Is your neck circumference greater than 17 inches (Male) or 16 inches (Female)? 0   Gender - Male 0=No   STOP-BANG Total Score 2            Assessment and Plan:     Followed by general surgery, Hiatal hernia    Laparoscopic hiatal hernia repair w/fundoplication w/Dr Clifton on 1/3/2024

## 2024-01-03 ENCOUNTER — ANESTHESIA EVENT (OUTPATIENT)
Dept: OPERATING ROOM | Facility: HOSPITAL | Age: 54
End: 2024-01-03
Payer: COMMERCIAL

## 2024-01-03 ENCOUNTER — HOSPITAL ENCOUNTER (OUTPATIENT)
Facility: HOSPITAL | Age: 54
LOS: 1 days | Discharge: HOME | End: 2024-01-04
Attending: SURGERY | Admitting: SURGERY
Payer: COMMERCIAL

## 2024-01-03 ENCOUNTER — APPOINTMENT (OUTPATIENT)
Dept: RADIOLOGY | Facility: HOSPITAL | Age: 54
End: 2024-01-03
Payer: COMMERCIAL

## 2024-01-03 ENCOUNTER — ANESTHESIA (OUTPATIENT)
Dept: OPERATING ROOM | Facility: HOSPITAL | Age: 54
End: 2024-01-03
Payer: COMMERCIAL

## 2024-01-03 DIAGNOSIS — G89.18 POST-OP PAIN: ICD-10-CM

## 2024-01-03 DIAGNOSIS — K44.9 HIATAL HERNIA: ICD-10-CM

## 2024-01-03 DIAGNOSIS — K21.9 CHRONIC GASTROESOPHAGEAL REFLUX DISEASE: Primary | ICD-10-CM

## 2024-01-03 DIAGNOSIS — K21.00 GASTROESOPHAGEAL REFLUX DISEASE WITH ESOPHAGITIS WITHOUT HEMORRHAGE: ICD-10-CM

## 2024-01-03 PROCEDURE — 2500000004 HC RX 250 GENERAL PHARMACY W/ HCPCS (ALT 636 FOR OP/ED): Performed by: SURGERY

## 2024-01-03 PROCEDURE — 2500000004 HC RX 250 GENERAL PHARMACY W/ HCPCS (ALT 636 FOR OP/ED): Performed by: STUDENT IN AN ORGANIZED HEALTH CARE EDUCATION/TRAINING PROGRAM

## 2024-01-03 PROCEDURE — 43235 EGD DIAGNOSTIC BRUSH WASH: CPT | Performed by: SURGERY

## 2024-01-03 PROCEDURE — 2500000004 HC RX 250 GENERAL PHARMACY W/ HCPCS (ALT 636 FOR OP/ED)

## 2024-01-03 PROCEDURE — 2780000003 HC OR 278 NO HCPCS: Performed by: SURGERY

## 2024-01-03 PROCEDURE — A43280 PR LAP,ESOPHAGOGAST FUNDOPLASTY: Performed by: ANESTHESIOLOGY

## 2024-01-03 PROCEDURE — 74018 RADEX ABDOMEN 1 VIEW: CPT | Performed by: RADIOLOGY

## 2024-01-03 PROCEDURE — 1100000001 HC PRIVATE ROOM DAILY

## 2024-01-03 PROCEDURE — A4217 STERILE WATER/SALINE, 500 ML: HCPCS | Performed by: SURGERY

## 2024-01-03 PROCEDURE — C1781 MESH (IMPLANTABLE): HCPCS | Performed by: SURGERY

## 2024-01-03 PROCEDURE — 3700000001 HC GENERAL ANESTHESIA TIME - INITIAL BASE CHARGE: Performed by: SURGERY

## 2024-01-03 PROCEDURE — 7100000001 HC RECOVERY ROOM TIME - INITIAL BASE CHARGE: Performed by: SURGERY

## 2024-01-03 PROCEDURE — 43281 LAP PARAESOPHAG HERN REPAIR: CPT | Performed by: SURGERY

## 2024-01-03 PROCEDURE — 74018 RADEX ABDOMEN 1 VIEW: CPT

## 2024-01-03 PROCEDURE — 2500000004 HC RX 250 GENERAL PHARMACY W/ HCPCS (ALT 636 FOR OP/ED): Performed by: NURSE ANESTHETIST, CERTIFIED REGISTERED

## 2024-01-03 PROCEDURE — 3600000009 HC OR TIME - EACH INCREMENTAL 1 MINUTE - PROCEDURE LEVEL FOUR: Performed by: SURGERY

## 2024-01-03 PROCEDURE — 36415 COLL VENOUS BLD VENIPUNCTURE: CPT | Performed by: STUDENT IN AN ORGANIZED HEALTH CARE EDUCATION/TRAINING PROGRAM

## 2024-01-03 PROCEDURE — 7100000002 HC RECOVERY ROOM TIME - EACH INCREMENTAL 1 MINUTE: Performed by: SURGERY

## 2024-01-03 PROCEDURE — 2500000005 HC RX 250 GENERAL PHARMACY W/O HCPCS: Performed by: NURSE ANESTHETIST, CERTIFIED REGISTERED

## 2024-01-03 PROCEDURE — 2720000007 HC OR 272 NO HCPCS: Performed by: SURGERY

## 2024-01-03 PROCEDURE — 43281 LAP PARAESOPHAG HERN REPAIR: CPT | Performed by: STUDENT IN AN ORGANIZED HEALTH CARE EDUCATION/TRAINING PROGRAM

## 2024-01-03 PROCEDURE — 96372 THER/PROPH/DIAG INJ SC/IM: CPT | Performed by: STUDENT IN AN ORGANIZED HEALTH CARE EDUCATION/TRAINING PROGRAM

## 2024-01-03 PROCEDURE — A43280 PR LAP,ESOPHAGOGAST FUNDOPLASTY: Performed by: NURSE ANESTHETIST, CERTIFIED REGISTERED

## 2024-01-03 PROCEDURE — 2500000001 HC RX 250 WO HCPCS SELF ADMINISTERED DRUGS (ALT 637 FOR MEDICARE OP): Performed by: STUDENT IN AN ORGANIZED HEALTH CARE EDUCATION/TRAINING PROGRAM

## 2024-01-03 PROCEDURE — 3600000004 HC OR TIME - INITIAL BASE CHARGE - PROCEDURE LEVEL FOUR: Performed by: SURGERY

## 2024-01-03 PROCEDURE — 2500000004 HC RX 250 GENERAL PHARMACY W/ HCPCS (ALT 636 FOR OP/ED): Performed by: ANESTHESIOLOGY

## 2024-01-03 PROCEDURE — 2500000005 HC RX 250 GENERAL PHARMACY W/O HCPCS

## 2024-01-03 PROCEDURE — 3700000002 HC GENERAL ANESTHESIA TIME - EACH INCREMENTAL 1 MINUTE: Performed by: SURGERY

## 2024-01-03 DEVICE — BIO-A TISSUE REINFORCEMENT 7CMX10CM
Type: IMPLANTABLE DEVICE | Site: ABDOMEN | Status: FUNCTIONAL
Brand: GORE BIO-A TISSUE REINFORCEMENT

## 2024-01-03 RX ORDER — SCOLOPAMINE TRANSDERMAL SYSTEM 1 MG/1
1 PATCH, EXTENDED RELEASE TRANSDERMAL ONCE
Status: DISCONTINUED | OUTPATIENT
Start: 2024-01-03 | End: 2024-01-03 | Stop reason: HOSPADM

## 2024-01-03 RX ORDER — SODIUM CHLORIDE 0.9 G/100ML
IRRIGANT IRRIGATION AS NEEDED
Status: DISCONTINUED | OUTPATIENT
Start: 2024-01-03 | End: 2024-01-03 | Stop reason: HOSPADM

## 2024-01-03 RX ORDER — HEPARIN SODIUM 5000 [USP'U]/ML
5000 INJECTION, SOLUTION INTRAVENOUS; SUBCUTANEOUS EVERY 8 HOURS SCHEDULED
Status: DISCONTINUED | OUTPATIENT
Start: 2024-01-03 | End: 2024-01-04 | Stop reason: HOSPADM

## 2024-01-03 RX ORDER — KETOROLAC TROMETHAMINE 30 MG/ML
15 INJECTION, SOLUTION INTRAMUSCULAR; INTRAVENOUS EVERY 6 HOURS
Status: DISCONTINUED | OUTPATIENT
Start: 2024-01-03 | End: 2024-01-04 | Stop reason: HOSPADM

## 2024-01-03 RX ORDER — SENNOSIDES 8.8 MG/5ML
10 LIQUID ORAL 2 TIMES DAILY
Status: DISCONTINUED | OUTPATIENT
Start: 2024-01-03 | End: 2024-01-04 | Stop reason: HOSPADM

## 2024-01-03 RX ORDER — MIDAZOLAM HYDROCHLORIDE 1 MG/ML
1 INJECTION, SOLUTION INTRAMUSCULAR; INTRAVENOUS ONCE AS NEEDED
Status: DISCONTINUED | OUTPATIENT
Start: 2024-01-03 | End: 2024-01-03 | Stop reason: HOSPADM

## 2024-01-03 RX ORDER — ONDANSETRON HYDROCHLORIDE 2 MG/ML
4 INJECTION, SOLUTION INTRAVENOUS EVERY 8 HOURS PRN
Status: DISCONTINUED | OUTPATIENT
Start: 2024-01-03 | End: 2024-01-04 | Stop reason: HOSPADM

## 2024-01-03 RX ORDER — BUPIVACAINE HYDROCHLORIDE 2.5 MG/ML
INJECTION, SOLUTION EPIDURAL; INFILTRATION; INTRACAUDAL AS NEEDED
Status: DISCONTINUED | OUTPATIENT
Start: 2024-01-03 | End: 2024-01-03 | Stop reason: HOSPADM

## 2024-01-03 RX ORDER — ALBUTEROL SULFATE 90 UG/1
2 AEROSOL, METERED RESPIRATORY (INHALATION) EVERY 4 HOURS PRN
Status: DISCONTINUED | OUTPATIENT
Start: 2024-01-03 | End: 2024-01-03

## 2024-01-03 RX ORDER — CEFAZOLIN SODIUM 2 G/100ML
2 INJECTION, SOLUTION INTRAVENOUS ONCE
Status: COMPLETED | OUTPATIENT
Start: 2024-01-03 | End: 2024-01-03

## 2024-01-03 RX ORDER — ALBUTEROL SULFATE 0.83 MG/ML
2.5 SOLUTION RESPIRATORY (INHALATION) ONCE AS NEEDED
Status: DISCONTINUED | OUTPATIENT
Start: 2024-01-03 | End: 2024-01-03 | Stop reason: HOSPADM

## 2024-01-03 RX ORDER — HYDROMORPHONE HYDROCHLORIDE 1 MG/ML
1 INJECTION, SOLUTION INTRAMUSCULAR; INTRAVENOUS; SUBCUTANEOUS EVERY 5 MIN PRN
Status: DISCONTINUED | OUTPATIENT
Start: 2024-01-03 | End: 2024-01-03 | Stop reason: HOSPADM

## 2024-01-03 RX ORDER — DEXAMETHASONE SODIUM PHOSPHATE 4 MG/ML
INJECTION, SOLUTION INTRA-ARTICULAR; INTRALESIONAL; INTRAMUSCULAR; INTRAVENOUS; SOFT TISSUE AS NEEDED
Status: DISCONTINUED | OUTPATIENT
Start: 2024-01-03 | End: 2024-01-03

## 2024-01-03 RX ORDER — SUCCINYLCHOLINE CHLORIDE 20 MG/ML INJECTION SOLUTION
SOLUTION AS NEEDED
Status: DISCONTINUED | OUTPATIENT
Start: 2024-01-03 | End: 2024-01-03

## 2024-01-03 RX ORDER — HYDRALAZINE HYDROCHLORIDE 20 MG/ML
5 INJECTION INTRAMUSCULAR; INTRAVENOUS EVERY 30 MIN PRN
Status: DISCONTINUED | OUTPATIENT
Start: 2024-01-03 | End: 2024-01-03 | Stop reason: HOSPADM

## 2024-01-03 RX ORDER — ONDANSETRON HYDROCHLORIDE 2 MG/ML
4 INJECTION, SOLUTION INTRAVENOUS ONCE AS NEEDED
Status: COMPLETED | OUTPATIENT
Start: 2024-01-03 | End: 2024-01-03

## 2024-01-03 RX ORDER — PROPOFOL 10 MG/ML
INJECTION, EMULSION INTRAVENOUS AS NEEDED
Status: DISCONTINUED | OUTPATIENT
Start: 2024-01-03 | End: 2024-01-03

## 2024-01-03 RX ORDER — LABETALOL HYDROCHLORIDE 5 MG/ML
5 INJECTION, SOLUTION INTRAVENOUS ONCE AS NEEDED
Status: DISCONTINUED | OUTPATIENT
Start: 2024-01-03 | End: 2024-01-03 | Stop reason: HOSPADM

## 2024-01-03 RX ORDER — DIPHENHYDRAMINE HYDROCHLORIDE 50 MG/ML
25 INJECTION INTRAMUSCULAR; INTRAVENOUS ONCE AS NEEDED
Status: DISCONTINUED | OUTPATIENT
Start: 2024-01-03 | End: 2024-01-03 | Stop reason: HOSPADM

## 2024-01-03 RX ORDER — OXYCODONE HCL 5 MG/5 ML
5 SOLUTION, ORAL ORAL EVERY 4 HOURS PRN
Status: DISCONTINUED | OUTPATIENT
Start: 2024-01-03 | End: 2024-01-04 | Stop reason: HOSPADM

## 2024-01-03 RX ORDER — SODIUM CHLORIDE, SODIUM LACTATE, POTASSIUM CHLORIDE, CALCIUM CHLORIDE 600; 310; 30; 20 MG/100ML; MG/100ML; MG/100ML; MG/100ML
100 INJECTION, SOLUTION INTRAVENOUS CONTINUOUS
Status: DISCONTINUED | OUTPATIENT
Start: 2024-01-03 | End: 2024-01-03 | Stop reason: HOSPADM

## 2024-01-03 RX ORDER — ALBUTEROL SULFATE 90 UG/1
2 AEROSOL, METERED RESPIRATORY (INHALATION)
Status: DISCONTINUED | OUTPATIENT
Start: 2024-01-03 | End: 2024-01-04

## 2024-01-03 RX ORDER — METOPROLOL TARTRATE 1 MG/ML
5 INJECTION, SOLUTION INTRAVENOUS ONCE
Status: DISCONTINUED | OUTPATIENT
Start: 2024-01-03 | End: 2024-01-03 | Stop reason: HOSPADM

## 2024-01-03 RX ORDER — ACETAMINOPHEN 10 MG/ML
1000 INJECTION, SOLUTION INTRAVENOUS EVERY 6 HOURS SCHEDULED
Status: DISCONTINUED | OUTPATIENT
Start: 2024-01-03 | End: 2024-01-04 | Stop reason: HOSPADM

## 2024-01-03 RX ORDER — MEPERIDINE HYDROCHLORIDE 25 MG/ML
12.5 INJECTION INTRAMUSCULAR; INTRAVENOUS; SUBCUTANEOUS EVERY 10 MIN PRN
Status: DISCONTINUED | OUTPATIENT
Start: 2024-01-03 | End: 2024-01-03 | Stop reason: HOSPADM

## 2024-01-03 RX ORDER — FAMOTIDINE 10 MG/ML
20 INJECTION INTRAVENOUS ONCE
Status: COMPLETED | OUTPATIENT
Start: 2024-01-03 | End: 2024-01-03

## 2024-01-03 RX ORDER — ROCURONIUM BROMIDE 10 MG/ML
INJECTION, SOLUTION INTRAVENOUS AS NEEDED
Status: DISCONTINUED | OUTPATIENT
Start: 2024-01-03 | End: 2024-01-03

## 2024-01-03 RX ORDER — HEPARIN SODIUM 5000 [USP'U]/ML
5000 INJECTION, SOLUTION INTRAVENOUS; SUBCUTANEOUS ONCE
Status: COMPLETED | OUTPATIENT
Start: 2024-01-03 | End: 2024-01-03

## 2024-01-03 RX ORDER — LIDOCAINE HCL/PF 100 MG/5ML
SYRINGE (ML) INTRAVENOUS AS NEEDED
Status: DISCONTINUED | OUTPATIENT
Start: 2024-01-03 | End: 2024-01-03

## 2024-01-03 RX ORDER — ONDANSETRON HYDROCHLORIDE 2 MG/ML
INJECTION, SOLUTION INTRAVENOUS AS NEEDED
Status: DISCONTINUED | OUTPATIENT
Start: 2024-01-03 | End: 2024-01-03

## 2024-01-03 RX ORDER — DEXTROMETHORPHAN/PSEUDOEPHED 2.5-7.5/.8
40 DROPS ORAL 4 TIMES DAILY PRN
Status: DISCONTINUED | OUTPATIENT
Start: 2024-01-03 | End: 2024-01-04 | Stop reason: HOSPADM

## 2024-01-03 RX ORDER — SCOLOPAMINE TRANSDERMAL SYSTEM 1 MG/1
1 PATCH, EXTENDED RELEASE TRANSDERMAL ONCE
Status: DISCONTINUED | OUTPATIENT
Start: 2024-01-03 | End: 2024-01-03

## 2024-01-03 RX ORDER — BISACODYL 5 MG
10 TABLET, DELAYED RELEASE (ENTERIC COATED) ORAL DAILY PRN
Status: DISCONTINUED | OUTPATIENT
Start: 2024-01-03 | End: 2024-01-04 | Stop reason: HOSPADM

## 2024-01-03 RX ORDER — MIDAZOLAM HYDROCHLORIDE 1 MG/ML
INJECTION, SOLUTION INTRAMUSCULAR; INTRAVENOUS AS NEEDED
Status: DISCONTINUED | OUTPATIENT
Start: 2024-01-03 | End: 2024-01-03

## 2024-01-03 RX ORDER — SODIUM CHLORIDE, SODIUM LACTATE, POTASSIUM CHLORIDE, CALCIUM CHLORIDE 600; 310; 30; 20 MG/100ML; MG/100ML; MG/100ML; MG/100ML
150 INJECTION, SOLUTION INTRAVENOUS CONTINUOUS
Status: DISCONTINUED | OUTPATIENT
Start: 2024-01-03 | End: 2024-01-04 | Stop reason: HOSPADM

## 2024-01-03 RX ORDER — OXYCODONE HCL 5 MG/5 ML
10 SOLUTION, ORAL ORAL EVERY 4 HOURS PRN
Status: DISCONTINUED | OUTPATIENT
Start: 2024-01-03 | End: 2024-01-04 | Stop reason: HOSPADM

## 2024-01-03 RX ORDER — PANTOPRAZOLE SODIUM 40 MG/10ML
40 INJECTION, POWDER, LYOPHILIZED, FOR SOLUTION INTRAVENOUS
Status: DISCONTINUED | OUTPATIENT
Start: 2024-01-04 | End: 2024-01-04 | Stop reason: HOSPADM

## 2024-01-03 RX ORDER — ACETAMINOPHEN 325 MG/1
975 TABLET ORAL ONCE
Status: DISCONTINUED | OUTPATIENT
Start: 2024-01-03 | End: 2024-01-03 | Stop reason: HOSPADM

## 2024-01-03 RX ORDER — MORPHINE SULFATE 2 MG/ML
2 INJECTION, SOLUTION INTRAMUSCULAR; INTRAVENOUS EVERY 5 MIN PRN
Status: DISCONTINUED | OUTPATIENT
Start: 2024-01-03 | End: 2024-01-03 | Stop reason: HOSPADM

## 2024-01-03 RX ORDER — SODIUM CHLORIDE, SODIUM LACTATE, POTASSIUM CHLORIDE, CALCIUM CHLORIDE 600; 310; 30; 20 MG/100ML; MG/100ML; MG/100ML; MG/100ML
100 INJECTION, SOLUTION INTRAVENOUS CONTINUOUS
Status: DISCONTINUED | OUTPATIENT
Start: 2024-01-03 | End: 2024-01-04 | Stop reason: HOSPADM

## 2024-01-03 RX ORDER — FENTANYL CITRATE 50 UG/ML
INJECTION, SOLUTION INTRAMUSCULAR; INTRAVENOUS AS NEEDED
Status: DISCONTINUED | OUTPATIENT
Start: 2024-01-03 | End: 2024-01-03

## 2024-01-03 RX ADMIN — Medication 140 MG: at 14:57

## 2024-01-03 RX ADMIN — SODIUM CHLORIDE, POTASSIUM CHLORIDE, SODIUM LACTATE AND CALCIUM CHLORIDE: 600; 310; 30; 20 INJECTION, SOLUTION INTRAVENOUS at 15:21

## 2024-01-03 RX ADMIN — SUGAMMADEX 200 MG: 100 INJECTION, SOLUTION INTRAVENOUS at 16:04

## 2024-01-03 RX ADMIN — FAMOTIDINE 20 MG: 10 INJECTION, SOLUTION INTRAVENOUS at 17:31

## 2024-01-03 RX ADMIN — OXYCODONE HYDROCHLORIDE 10 MG: 5 SOLUTION ORAL at 20:45

## 2024-01-03 RX ADMIN — SCOPALAMINE 1 PATCH: 1 PATCH, EXTENDED RELEASE TRANSDERMAL at 13:47

## 2024-01-03 RX ADMIN — PROPOFOL 150 MG: 10 INJECTION, EMULSION INTRAVENOUS at 14:56

## 2024-01-03 RX ADMIN — SODIUM CHLORIDE, POTASSIUM CHLORIDE, SODIUM LACTATE AND CALCIUM CHLORIDE 150 ML/HR: 600; 310; 30; 20 INJECTION, SOLUTION INTRAVENOUS at 13:41

## 2024-01-03 RX ADMIN — SENNOSIDES 10 ML: 8.8 LIQUID ORAL at 20:46

## 2024-01-03 RX ADMIN — ROCURONIUM BROMIDE 40 MG: 10 INJECTION, SOLUTION INTRAVENOUS at 15:05

## 2024-01-03 RX ADMIN — ONDANSETRON 4 MG: 2 INJECTION INTRAMUSCULAR; INTRAVENOUS at 17:15

## 2024-01-03 RX ADMIN — HEPARIN SODIUM 5000 UNITS: 5000 INJECTION INTRAVENOUS; SUBCUTANEOUS at 13:47

## 2024-01-03 RX ADMIN — PROPOFOL 50 MG: 10 INJECTION, EMULSION INTRAVENOUS at 15:45

## 2024-01-03 RX ADMIN — ROCURONIUM BROMIDE 10 MG: 10 INJECTION, SOLUTION INTRAVENOUS at 14:56

## 2024-01-03 RX ADMIN — DEXAMETHASONE SODIUM PHOSPHATE 8 MG: 4 INJECTION, SOLUTION INTRAMUSCULAR; INTRAVENOUS at 15:03

## 2024-01-03 RX ADMIN — HYDROMORPHONE HYDROCHLORIDE 1 MG: 2 INJECTION, SOLUTION INTRAMUSCULAR; INTRAVENOUS; SUBCUTANEOUS at 16:34

## 2024-01-03 RX ADMIN — ONDANSETRON 4 MG: 2 INJECTION INTRAMUSCULAR; INTRAVENOUS at 16:01

## 2024-01-03 RX ADMIN — SODIUM CHLORIDE, POTASSIUM CHLORIDE, SODIUM LACTATE AND CALCIUM CHLORIDE: 600; 310; 30; 20 INJECTION, SOLUTION INTRAVENOUS at 14:51

## 2024-01-03 RX ADMIN — HYDROMORPHONE HYDROCHLORIDE 1 MG: 2 INJECTION, SOLUTION INTRAMUSCULAR; INTRAVENOUS; SUBCUTANEOUS at 15:19

## 2024-01-03 RX ADMIN — MIDAZOLAM 2 MG: 1 INJECTION INTRAMUSCULAR; INTRAVENOUS at 14:52

## 2024-01-03 RX ADMIN — FENTANYL CITRATE 50 MCG: 50 INJECTION, SOLUTION INTRAMUSCULAR; INTRAVENOUS at 15:46

## 2024-01-03 RX ADMIN — LIDOCAINE HYDROCHLORIDE 60 MG: 20 INJECTION INTRAVENOUS at 14:56

## 2024-01-03 RX ADMIN — FENTANYL CITRATE 50 MCG: 50 INJECTION, SOLUTION INTRAMUSCULAR; INTRAVENOUS at 14:52

## 2024-01-03 RX ADMIN — FENTANYL CITRATE 50 MCG: 50 INJECTION, SOLUTION INTRAMUSCULAR; INTRAVENOUS at 14:56

## 2024-01-03 RX ADMIN — FENTANYL CITRATE 50 MCG: 50 INJECTION, SOLUTION INTRAMUSCULAR; INTRAVENOUS at 15:29

## 2024-01-03 RX ADMIN — CEFAZOLIN SODIUM 2 G: 2 INJECTION, SOLUTION INTRAVENOUS at 15:01

## 2024-01-03 SDOH — ECONOMIC STABILITY: HOUSING INSECURITY
IN THE LAST 12 MONTHS, WAS THERE A TIME WHEN YOU DID NOT HAVE A STEADY PLACE TO SLEEP OR SLEPT IN A SHELTER (INCLUDING NOW)?: NO

## 2024-01-03 SDOH — ECONOMIC STABILITY: FOOD INSECURITY: WITHIN THE PAST 12 MONTHS, THE FOOD YOU BOUGHT JUST DIDN’T LAST AND YOU DIDN’T HAVE MONEY TO GET MORE.: NEVER TRUE

## 2024-01-03 SDOH — ECONOMIC STABILITY: FOOD INSECURITY: WITHIN THE PAST 12 MONTHS, THE FOOD YOU BOUGHT JUST DIDN'T LAST AND YOU DIDN'T HAVE MONEY TO GET MORE.: NEVER TRUE

## 2024-01-03 SDOH — ECONOMIC STABILITY: GENERAL

## 2024-01-03 SDOH — ECONOMIC STABILITY: TRANSPORTATION INSECURITY
IN THE PAST 12 MONTHS, HAS THE LACK OF TRANSPORTATION KEPT YOU FROM MEDICAL APPOINTMENTS OR FROM GETTING MEDICATIONS?: NO

## 2024-01-03 SDOH — ECONOMIC STABILITY: FOOD INSECURITY: WITHIN THE PAST 12 MONTHS, YOU WORRIED THAT YOUR FOOD WOULD RUN OUT BEFORE YOU GOT MONEY TO BUY MORE.: SOMETIMES TRUE

## 2024-01-03 SDOH — HEALTH STABILITY: MENTAL HEALTH: CURRENT SMOKER: 0

## 2024-01-03 SDOH — ECONOMIC STABILITY: HOUSING INSECURITY: IN THE LAST 12 MONTHS, WAS THERE A TIME WHEN YOU WERE NOT ABLE TO PAY THE MORTGAGE OR RENT ON TIME?: NO

## 2024-01-03 SDOH — ECONOMIC STABILITY: TRANSPORTATION INSECURITY: IN THE PAST 12 MONTHS, HAS LACK OF TRANSPORTATION KEPT YOU FROM MEDICAL APPOINTMENTS OR FROM GETTING MEDICATIONS?: NO

## 2024-01-03 SDOH — ECONOMIC STABILITY: FOOD INSECURITY

## 2024-01-03 SDOH — ECONOMIC STABILITY: HOUSING INSECURITY: IN THE LAST 12 MONTHS, HOW MANY PLACES HAVE YOU LIVED?: 2

## 2024-01-03 SDOH — ECONOMIC STABILITY: INCOME INSECURITY: IN THE LAST 12 MONTHS, WAS THERE A TIME WHEN YOU WERE NOT ABLE TO PAY THE MORTGAGE OR RENT ON TIME?: NO

## 2024-01-03 SDOH — ECONOMIC STABILITY: HOUSING INSECURITY: IN THE PAST 12 MONTHS HAS THE ELECTRIC, GAS, OIL, OR WATER COMPANY THREATENED TO SHUT OFF SERVICES IN YOUR HOME?: YES

## 2024-01-03 SDOH — ECONOMIC STABILITY: FOOD INSECURITY
WITHIN THE PAST 12 MONTHS, YOU WORRIED THAT YOUR FOOD WOULD RUN OUT BEFORE YOU GOT THE MONEY TO BUY MORE.: SOMETIMES TRUE

## 2024-01-03 SDOH — ECONOMIC STABILITY: TRANSPORTATION INSECURITY
IN THE PAST 12 MONTHS, HAS LACK OF TRANSPORTATION KEPT YOU FROM MEETINGS, WORK, OR FROM GETTING THINGS NEEDED FOR DAILY LIVING?: NO

## 2024-01-03 SDOH — ECONOMIC STABILITY: HOUSING INSECURITY

## 2024-01-03 SDOH — ECONOMIC STABILITY: TRANSPORTATION INSECURITY

## 2024-01-03 ASSESSMENT — PAIN - FUNCTIONAL ASSESSMENT
PAIN_FUNCTIONAL_ASSESSMENT: 0-10

## 2024-01-03 ASSESSMENT — PAIN SCALES - GENERAL
PAINLEVEL_OUTOF10: 5 - MODERATE PAIN
PAIN_LEVEL: 6
PAINLEVEL_OUTOF10: 5 - MODERATE PAIN
PAINLEVEL_OUTOF10: 5 - MODERATE PAIN
PAINLEVEL_OUTOF10: 8
PAINLEVEL_OUTOF10: 8
PAINLEVEL_OUTOF10: 5 - MODERATE PAIN
PAINLEVEL_OUTOF10: 4
PAINLEVEL_OUTOF10: 0 - NO PAIN

## 2024-01-03 ASSESSMENT — ACTIVITIES OF DAILY LIVING (ADL)
GROOMING: INDEPENDENT
BATHING: INDEPENDENT
TOILETING: INDEPENDENT
HEARING_LEFT_EAR: NO PROBLEMS
ASSISTIVE_DEVICES: EYEGLASSES
WALKS_IN_HOME: INDEPENDENTLY
HOW_WELL_CAN_YOU_DRESS_YOURSELF: INDEPENDENTLY
WALKS IN HOME: INDEPENDENT
ASSISTIVE_DEVICE: EYEGLASSES
HOW_WELL_CAN_YOU_BATHE_YOURSELF: INDEPENDENTLY
HOW_WELL_CAN_YOU_FEED_YOURSELF: INDEPENDENTLY
FEEDING: INDEPENDENT
BATHING: INDEPENDENT
HOW_WELL_CAN_YOU_USE_BATHROOM_BY_YOURSELF: INDEPENDENTLY
FEEDING YOURSELF: INDEPENDENT
HOW_WELL_CAN_YOU_COMPLETE_GROOMING_TASKS: INDEPENDENTLY
HEARING_RIGHT_EAR: NO PROBLEMS
PATIENT'S MEMORY ADEQUATE TO SAFELY COMPLETE DAILY ACTIVITIES?: YES
ADEQUATE_TO_COMPLETE_ADL: YES
HEARING - LEFT EAR: FUNCTIONAL
JUDGMENT_ADEQUATE_SAFELY_COMPLETE_DAILY_ACTIVITIES: YES
ADL_BEFORE_ADMISSION: RIGHT
ADEQUATE_TO_COMPLETE_ADL: YES
DRESSING: INDEPENDENT
DRESSING YOURSELF: INDEPENDENT
HEARING - RIGHT EAR: FUNCTIONAL
TOILETING: INDEPENDENT
ADEQUATE_TO_COMPLETE_ADL: YES
LACK_OF_TRANSPORTATION: NO
ADL_BEFORE_ADMISSION: INDEPENDENTLY

## 2024-01-03 ASSESSMENT — COGNITIVE AND FUNCTIONAL STATUS - GENERAL
MOBILITY SCORE: 24
DAILY ACTIVITIY SCORE: 24
PATIENT BASELINE BEDBOUND: NO

## 2024-01-03 ASSESSMENT — PAIN DESCRIPTION - DESCRIPTORS: DESCRIPTORS: SORE

## 2024-01-03 ASSESSMENT — SOCIAL DETERMINANTS OF HEALTH (SDOH): IN THE PAST 12 MONTHS, HAS THE ELECTRIC, GAS, OIL, OR WATER COMPANY THREATENED TO SHUT OFF SERVICE IN YOUR HOME?: YES

## 2024-01-03 ASSESSMENT — COLUMBIA-SUICIDE SEVERITY RATING SCALE - C-SSRS
6. HAVE YOU EVER DONE ANYTHING, STARTED TO DO ANYTHING, OR PREPARED TO DO ANYTHING TO END YOUR LIFE?: NO
1. IN THE PAST MONTH, HAVE YOU WISHED YOU WERE DEAD OR WISHED YOU COULD GO TO SLEEP AND NOT WAKE UP?: NO
2. HAVE YOU ACTUALLY HAD ANY THOUGHTS OF KILLING YOURSELF?: NO

## 2024-01-03 NOTE — ANESTHESIA POSTPROCEDURE EVALUATION
Patient: Sonja Carrera    Procedure Summary       Date: 01/03/24 Room / Location: [facility] 04 / [facility]    Anesthesia Start: 1451 Anesthesia Stop:     Procedure: LAPAROSCOPIC HIATAL HERNIA REPAIR WITH  MESH FUNDOPLICATION/EGD/TAP BLOCK Diagnosis:       Gastroesophageal reflux disease with esophagitis without hemorrhage      (Gastroesophageal reflux disease with esophagitis without hemorrhage [K21.00])    Surgeons: Elisabeth Clifton MD Responsible Provider: Jamison Beckford MD    Anesthesia Type: general ASA Status: 3            Anesthesia Type: general    Vitals Value Taken Time   /69 01/03/24 1643   Temp 36.7 °C (98.1 °F) 01/03/24 1643   Pulse 97 01/03/24 1643   Resp 16 01/03/24 1643   SpO2 100 % 01/03/24 1643       Anesthesia Post Evaluation    Patient location during evaluation: PACU  Patient participation: complete - patient participated  Level of consciousness: awake and alert  Pain score: 6  Pain management: adequate  Airway patency: patent  Cardiovascular status: acceptable, blood pressure returned to baseline and hemodynamically stable  Respiratory status: acceptable and face mask  Hydration status: acceptable  Postoperative Nausea and Vomiting: none        There were no known notable events for this encounter.

## 2024-01-03 NOTE — ANESTHESIA PREPROCEDURE EVALUATION
Patient: Sonja Carrera    Procedure Information       Date/Time: 01/03/24 1330    Procedure: LAPAROSCOPIC HIATAL HERNIA REPAIR WITH FUNDOPLICATION    Location: [facility] 09 / [facility]    Surgeons: Elisabeth Clifton MD            Relevant Problems   Anesthesia (within normal limits)   (-) PONV (postoperative nausea and vomiting)      Cardiovascular   (+) Benign hypertension      GI   (+) Chronic gastroesophageal reflux disease   (+) Gastroesophageal reflux disease with esophagitis without hemorrhage   (+) Gastroesophageal reflux disease without esophagitis   (+) Hiatal hernia      Neuro/Psych   (+) Anxiety and depression       Clinical information reviewed:    Allergies  Meds     OB Status           NPO Detail:  NPO/Void Status  Carbonhydrate Drink Given Prior to Surgery? : N  Date of Last Liquid: 01/03/24  Time of Last Liquid: 0930 (water with meds  this 0930 otherwise NPO since midnight)  Date of Last Solid: 01/02/24  Time of Last Solid: 2300  Last Intake Type: Light meal  Time of Last Void: 1330         Physical Exam    Airway  Mallampati: II  TM distance: >3 FB  Neck ROM: full     Cardiovascular - normal exam  Rhythm: regular  Rate: normal     Dental - normal exam     Pulmonary - normal exam     Abdominal            Anesthesia Plan    ASA 3     general     The patient is not a current smoker.    intravenous induction   Anesthetic plan and risks discussed with patient.    Plan discussed with CRNA and CAA.

## 2024-01-03 NOTE — ANESTHESIA PROCEDURE NOTES
Airway  Date/Time: 1/3/2024 2:58 PM  Urgency: elective      Staffing  Performed: CRNA   Authorized by: Jamison Beckford MD    Performed by: MATIAS Araujo-CRNA  Patient location during procedure: OR    Indications and Patient Condition  Indications for airway management: anesthesia  Spontaneous Ventilation: absent  Sedation level: deep  Preoxygenated: yes  Patient position: sniffing  Mask difficulty assessment: 1 - vent by mask  Planned trial extubation    Final Airway Details  Final airway type: endotracheal airway      Successful airway: ETT  Cuffed: yes   Successful intubation technique: video laryngoscopy (elective Mills 4 use)  Facilitating devices/methods: intubating stylet and cricoid pressure  Endotracheal tube insertion site: oral  Blade type: Mills 4.  Blade size: #4  ETT size (mm): 7.0  Cormack-Lehane Classification: grade I - full view of glottis  Placement verified by: chest auscultation, capnometry and palpation of cuff   Measured from: lips  ETT to lips (cm): 21  Number of attempts at approach: 1  Ventilation between attempts: none  Number of other approaches attempted: 0    Additional Comments  RSI with cricoid pressure maintained from beginning of sedation until confirmed EtCO2. Elective Mills 4 use.

## 2024-01-04 ENCOUNTER — APPOINTMENT (OUTPATIENT)
Dept: RADIOLOGY | Facility: HOSPITAL | Age: 54
End: 2024-01-04
Payer: COMMERCIAL

## 2024-01-04 VITALS
HEART RATE: 73 BPM | TEMPERATURE: 97.3 F | DIASTOLIC BLOOD PRESSURE: 69 MMHG | SYSTOLIC BLOOD PRESSURE: 145 MMHG | HEIGHT: 63 IN | WEIGHT: 164.02 LBS | RESPIRATION RATE: 16 BRPM | OXYGEN SATURATION: 96 % | BODY MASS INDEX: 29.06 KG/M2

## 2024-01-04 LAB
ALBUMIN SERPL BCP-MCNC: 4.3 G/DL (ref 3.4–5)
ALP SERPL-CCNC: 93 U/L (ref 33–110)
ALT SERPL W P-5'-P-CCNC: 131 U/L (ref 7–45)
ANION GAP SERPL CALC-SCNC: 18 MMOL/L (ref 10–20)
AST SERPL W P-5'-P-CCNC: 96 U/L (ref 9–39)
BILIRUB SERPL-MCNC: 0.4 MG/DL (ref 0–1.2)
BUN SERPL-MCNC: 9 MG/DL (ref 6–23)
CALCIUM SERPL-MCNC: 9.9 MG/DL (ref 8.6–10.3)
CHLORIDE SERPL-SCNC: 105 MMOL/L (ref 98–107)
CO2 SERPL-SCNC: 19 MMOL/L (ref 21–32)
CREAT SERPL-MCNC: 0.52 MG/DL (ref 0.5–1.05)
ERYTHROCYTE [DISTWIDTH] IN BLOOD BY AUTOMATED COUNT: 12.7 % (ref 11.5–14.5)
GFR SERPL CREATININE-BSD FRML MDRD: >90 ML/MIN/1.73M*2
GLUCOSE SERPL-MCNC: 115 MG/DL (ref 74–99)
HCT VFR BLD AUTO: 40.8 % (ref 36–46)
HGB BLD-MCNC: 13.1 G/DL (ref 12–16)
MCH RBC QN AUTO: 30.9 PG (ref 26–34)
MCHC RBC AUTO-ENTMCNC: 32.1 G/DL (ref 32–36)
MCV RBC AUTO: 96 FL (ref 80–100)
NRBC BLD-RTO: 0 /100 WBCS (ref 0–0)
PLATELET # BLD AUTO: 344 X10*3/UL (ref 150–450)
POTASSIUM SERPL-SCNC: 4.5 MMOL/L (ref 3.5–5.3)
PROT SERPL-MCNC: 7.2 G/DL (ref 6.4–8.2)
RBC # BLD AUTO: 4.24 X10*6/UL (ref 4–5.2)
SODIUM SERPL-SCNC: 137 MMOL/L (ref 136–145)
WBC # BLD AUTO: 12.5 X10*3/UL (ref 4.4–11.3)

## 2024-01-04 PROCEDURE — C9113 INJ PANTOPRAZOLE SODIUM, VIA: HCPCS | Performed by: STUDENT IN AN ORGANIZED HEALTH CARE EDUCATION/TRAINING PROGRAM

## 2024-01-04 PROCEDURE — 96372 THER/PROPH/DIAG INJ SC/IM: CPT | Performed by: STUDENT IN AN ORGANIZED HEALTH CARE EDUCATION/TRAINING PROGRAM

## 2024-01-04 PROCEDURE — 2550000001 HC RX 255 CONTRASTS: Performed by: SURGERY

## 2024-01-04 PROCEDURE — 74240 X-RAY XM UPR GI TRC 1CNTRST: CPT

## 2024-01-04 PROCEDURE — 2500000001 HC RX 250 WO HCPCS SELF ADMINISTERED DRUGS (ALT 637 FOR MEDICARE OP): Performed by: STUDENT IN AN ORGANIZED HEALTH CARE EDUCATION/TRAINING PROGRAM

## 2024-01-04 PROCEDURE — 85027 COMPLETE CBC AUTOMATED: CPT | Performed by: STUDENT IN AN ORGANIZED HEALTH CARE EDUCATION/TRAINING PROGRAM

## 2024-01-04 PROCEDURE — 36415 COLL VENOUS BLD VENIPUNCTURE: CPT | Performed by: STUDENT IN AN ORGANIZED HEALTH CARE EDUCATION/TRAINING PROGRAM

## 2024-01-04 PROCEDURE — 2500000004 HC RX 250 GENERAL PHARMACY W/ HCPCS (ALT 636 FOR OP/ED): Performed by: STUDENT IN AN ORGANIZED HEALTH CARE EDUCATION/TRAINING PROGRAM

## 2024-01-04 PROCEDURE — 74240 X-RAY XM UPR GI TRC 1CNTRST: CPT | Performed by: RADIOLOGY

## 2024-01-04 PROCEDURE — 80053 COMPREHEN METABOLIC PANEL: CPT | Performed by: STUDENT IN AN ORGANIZED HEALTH CARE EDUCATION/TRAINING PROGRAM

## 2024-01-04 PROCEDURE — 7100000011 HC EXTENDED STAY RECOVERY HOURLY - NURSING UNIT

## 2024-01-04 RX ORDER — ALBUTEROL SULFATE 90 UG/1
2 AEROSOL, METERED RESPIRATORY (INHALATION) EVERY 2 HOUR PRN
Status: DISCONTINUED | OUTPATIENT
Start: 2024-01-04 | End: 2024-01-04 | Stop reason: HOSPADM

## 2024-01-04 RX ADMIN — ACETAMINOPHEN 1000 MG: 10 INJECTION INTRAVENOUS at 01:04

## 2024-01-04 RX ADMIN — PANTOPRAZOLE SODIUM 40 MG: 40 INJECTION, POWDER, FOR SOLUTION INTRAVENOUS at 06:21

## 2024-01-04 RX ADMIN — SODIUM CHLORIDE, POTASSIUM CHLORIDE, SODIUM LACTATE AND CALCIUM CHLORIDE 100 ML/HR: 600; 310; 30; 20 INJECTION, SOLUTION INTRAVENOUS at 01:32

## 2024-01-04 RX ADMIN — HEPARIN SODIUM 5000 UNITS: 5000 INJECTION INTRAVENOUS; SUBCUTANEOUS at 14:18

## 2024-01-04 RX ADMIN — OXYCODONE HYDROCHLORIDE 10 MG: 5 SOLUTION ORAL at 01:30

## 2024-01-04 RX ADMIN — HEPARIN SODIUM 5000 UNITS: 5000 INJECTION INTRAVENOUS; SUBCUTANEOUS at 06:21

## 2024-01-04 RX ADMIN — OXYCODONE HYDROCHLORIDE 10 MG: 5 SOLUTION ORAL at 07:46

## 2024-01-04 RX ADMIN — ACETAMINOPHEN 1000 MG: 10 INJECTION INTRAVENOUS at 12:16

## 2024-01-04 RX ADMIN — ACETAMINOPHEN 1000 MG: 10 INJECTION INTRAVENOUS at 06:21

## 2024-01-04 RX ADMIN — SENNOSIDES 10 ML: 8.8 LIQUID ORAL at 09:17

## 2024-01-04 RX ADMIN — OXYCODONE HYDROCHLORIDE 5 MG: 5 SOLUTION ORAL at 13:21

## 2024-01-04 RX ADMIN — DIATRIZOATE MEGLUMINE AND DIATRIZOATE SODIUM 30 ML: 660; 100 LIQUID ORAL; RECTAL at 10:15

## 2024-01-04 SDOH — SOCIAL STABILITY: SOCIAL INSECURITY: ARE THERE ANY APPARENT SIGNS OF INJURIES/BEHAVIORS THAT COULD BE RELATED TO ABUSE/NEGLECT?: NO

## 2024-01-04 SDOH — SOCIAL STABILITY: SOCIAL INSECURITY: HAVE YOU HAD THOUGHTS OF HARMING ANYONE ELSE?: NO

## 2024-01-04 SDOH — SOCIAL STABILITY: SOCIAL INSECURITY: HAS ANYONE EVER THREATENED TO HURT YOUR FAMILY OR YOUR PETS?: NO

## 2024-01-04 SDOH — SOCIAL STABILITY: SOCIAL INSECURITY: ABUSE: ADULT

## 2024-01-04 SDOH — SOCIAL STABILITY: SOCIAL INSECURITY: WERE YOU ABLE TO COMPLETE ALL THE BEHAVIORAL HEALTH SCREENINGS?: YES

## 2024-01-04 SDOH — SOCIAL STABILITY: SOCIAL INSECURITY: ARE YOU OR HAVE YOU BEEN THREATENED OR ABUSED PHYSICALLY, EMOTIONALLY, OR SEXUALLY BY ANYONE?: NO

## 2024-01-04 SDOH — SOCIAL STABILITY: SOCIAL INSECURITY: DOES ANYONE TRY TO KEEP YOU FROM HAVING/CONTACTING OTHER FRIENDS OR DOING THINGS OUTSIDE YOUR HOME?: NO

## 2024-01-04 SDOH — SOCIAL STABILITY: SOCIAL INSECURITY: DO YOU FEEL ANYONE HAS EXPLOITED OR TAKEN ADVANTAGE OF YOU FINANCIALLY OR OF YOUR PERSONAL PROPERTY?: NO

## 2024-01-04 SDOH — SOCIAL STABILITY: SOCIAL INSECURITY: DO YOU FEEL UNSAFE GOING BACK TO THE PLACE WHERE YOU ARE LIVING?: NO

## 2024-01-04 ASSESSMENT — LIFESTYLE VARIABLES
PRESCIPTION_ABUSE_PAST_12_MONTHS: NO
HOW MANY STANDARD DRINKS CONTAINING ALCOHOL DO YOU HAVE ON A TYPICAL DAY: 1 OR 2
AUDIT-C TOTAL SCORE: 2
HOW OFTEN DO YOU HAVE 6 OR MORE DRINKS ON ONE OCCASION: LESS THAN MONTHLY
HOW OFTEN DO YOU HAVE A DRINK CONTAINING ALCOHOL: MONTHLY OR LESS
SUBSTANCE_ABUSE_PAST_12_MONTHS: NO
AUDIT-C TOTAL SCORE: 2
SKIP TO QUESTIONS 9-10: 0

## 2024-01-04 ASSESSMENT — PAIN SCALES - GENERAL
PAINLEVEL_OUTOF10: 8
PAINLEVEL_OUTOF10: 8
PAINLEVEL_OUTOF10: 3

## 2024-01-04 ASSESSMENT — PATIENT HEALTH QUESTIONNAIRE - PHQ9
2. FEELING DOWN, DEPRESSED OR HOPELESS: NOT AT ALL
1. LITTLE INTEREST OR PLEASURE IN DOING THINGS: NOT AT ALL
SUM OF ALL RESPONSES TO PHQ9 QUESTIONS 1 & 2: 0

## 2024-01-04 ASSESSMENT — ACTIVITIES OF DAILY LIVING (ADL): LACK_OF_TRANSPORTATION: PATIENT DECLINED

## 2024-01-04 ASSESSMENT — PAIN - FUNCTIONAL ASSESSMENT
PAIN_FUNCTIONAL_ASSESSMENT: 0-10
PAIN_FUNCTIONAL_ASSESSMENT: 0-10

## 2024-01-04 NOTE — DISCHARGE INSTRUCTIONS
Medications:  - Medications should be crushed and mixed with full liquids. Capsules may be opened and mixed with full liquids.  - Take 650mg Tylenol (liquid preferred) every 6 hours for pain. Take between doses of your opioid pain medication. Try to limit the use of your opioid pain medication and only take as needed.  - If you have medications that you still cannot tolerate by your first visit with your surgeon or dietitian, please discuss this.   - You should be receiving or already have received the following medications for your postoperative course:  antinausea medication (ondansetron, metoclopramide), and pain medication (oxycodone, morphine, hydromorphone, hydrocodone). If you are missing any of these, please call the office immediately so we can prescribe them for you.    Constipation  - Take fiber (benefiber or metamucil) twice daily. Please also take colace (docusate) twice a day while taking oxycodone or other opiates. If you remain constipated (no bowel movement for 2 days) despite these medications, please take milk of magnesia and call the office to notify us.    Activity instructions:   - No lifting, pulling, or pushing objects greater than 15 pounds for 6weeks to 3 months for first time operation, if this redo case or revision then you have to wait 3-6 mouths with weight restriction   - Activity otherwise as tolerated.   - You may shower. Soap and water may run over your incisions. Pat dry. No submerging in baths or  swimming (activities that keep your incisions underwater) for at least two weeks.    - You may not drive while taking narcotics.     Diet:   - You will go home on a full liquid diet (similar to the diet you were on your final day in the hospital). Acceptable full liquids include protein shakes, sugar free jello, sugar free pudding, and creamy soups (no chunks).  You will stay on this full liquid diet on days 1-7 post discharge.    - You will then go to pureed foods (things you smear with  a butter knife) on days 7-14 post discharge.   - You will then go to soft diet (things you can mash with a fork) on days 14-21 post discharge.   - For further information, follow the diet instructions listed in your surgery instruction packet.     Call Provider If:  - Breathing faster or harder than normal.   - Fever of 100.4 F (38 C) or higher or feeling of chills.   - Feeling very sleepy and difficult to awaken.   - Inability to drink or significant decrease in ability to drink since discharge.   - Vomiting (throwing up) and not able to eat or drink for 12 hours.   - Urinating much less than your normal.  - More than 4 loose, watery bowel movements in 24 hours (diarrhea).   - Any new concerning symptoms.

## 2024-01-04 NOTE — CARE PLAN
The patient's goals for the shift include      The clinical goals for the shift include        Problem: Pain  Goal: Takes deep breaths with improved pain control throughout the shift  Outcome: Progressing  Goal: Turns in bed with improved pain control throughout the shift  Outcome: Progressing  Goal: Walks with improved pain control throughout the shift  Outcome: Progressing  Goal: Performs ADL's with improved pain control throughout shift  Outcome: Progressing  Goal: Participates in PT with improved pain control throughout the shift  Outcome: Progressing  Goal: Free from opioid side effects throughout the shift  Outcome: Progressing  Goal: Free from acute confusion related to pain meds throughout the shift  Outcome: Progressing     Problem: Pain  Goal: My pain/discomfort is manageable  Outcome: Progressing     Problem: Safety  Goal: Patient will be injury free during hospitalization  Outcome: Progressing  Goal: I will remain free of falls  Outcome: Progressing     Problem: Daily Care  Goal: Daily care needs are met  Outcome: Progressing     Problem: Psychosocial Needs  Goal: Demonstrates ability to cope with hospitalization/illness  Outcome: Progressing  Goal: Collaborate with me, my family, and caregiver to identify my specific goals  Outcome: Progressing     Problem: Discharge Barriers  Goal: My discharge needs are met  Outcome: Progressing

## 2024-01-04 NOTE — BRIEF OP NOTE
Date: 1/3/2024  OR Location: [facility] OR    Name: Sonja Carrera, : 1970, Age: 53 y.o., MRN: [id number], Sex: female    Diagnosis  Pre-op Diagnosis     * Gastroesophageal reflux disease with esophagitis without hemorrhage [K21.00] Post-op Diagnosis     * Gastroesophageal reflux disease with esophagitis without hemorrhage [K21.00]     Procedures  LAPAROSCOPIC HIATAL HERNIA REPAIR WITH  MESH FUNDOPLICATION/EGD/TAP BLOCK  82860 - MT LAPS RPR PARAESPHGL HRNA INCL FUNDPLSTY W/O MESH    MT EGD TRANSORAL BIOPSY SINGLE/MULTIPLE [91377]  Surgeons      * Elisabeth Clifton - Primary    Resident/Fellow/Other Assistant:  Surgeon(s) and Role:     * Hakeem Peter MD - Fellow    Procedure Summary  Anesthesia: General  ASA: III  Anesthesia Staff: Anesthesiologist: Jamison Beckford MD  CRNA: MATIAS Infante-CRNA; MATIAS Araujo-CRNA  Estimated Blood Loss: 20 mL  Intra-op Medications:   Medication Name Total Dose   bupivacaine PF (Marcaine) 0.25 % (2.5 mg/mL) injection 52 mL   ondansetron (Zofran) injection 4 mg 4 mg              Anesthesia Record               Intraprocedure I/O Totals          Intake    LR 1300.00 mL    ceFAZolin in dextrose (iso-os) (Ancef) IVPB 2 g 100.00 mL    Total Intake 1400 mL       Output    Est. Blood Loss 20 mL    Total Output 20 mL       Net    Net Volume 1380 mL          Specimen: No specimens collected     Staff:   Circulator: Smita Adams RN; Heavenly Amaro RN  Relief Scrub: Dyana Lutz  Scrub Person: Miguel A Polanco RN    Details of the Operation:    Patient is a 53-year-old female with history of reflux and hiatal hernia underwent extensive preoperative evaluation and medical management and was referred by gastroenterology for repair of hiatal hernia and fundoplication.  She was scheduled on elective basis for laparoscopic hiatal hernia repair with fundoplication after detailed discussion was held with the patient regarding risk-benefit alternatives of the operation and she was scheduled on  an elective basis for the proposed operation. On the day of surgery after verification of informed consent she was taken to the OR Table SCDs were placed general anesthesia was established . Time out was done, standard preparation and drapping was performed. entry into abdominal cavity was obtained using 5 minute optical trocar in the left midclavicular line in the upper quadrant without technical problems pneumoperitoneum established 15 mercury there was no iatrogenic injuries.  Tap block was performed under laparoscopic vision in both axillary lines.  12 mm port was placed to the right of the umbilicus and a 5 mm port in the right upper quadrant area Veda liver retractor was placed through the epigastrium and left lobe of liver was retracted inspection of abdominal cavity revealed a large hiatal hernia roughly 3 cm defect anteriorly at the hiatus with GE junction herniating into the hiatus, dissection was started on the pars flaccida which was divided with LigaSure, dissection was continued down to the right andreina and right andreina was dissected, hernia sac and the contents medially were dissected, anteriorly membrane and hernia sac was divided and reduced back into the abdominal cavity, similar dissection was completed on the left side and left andreina was dissected, herniating gastri cfundus was reduced into abdominal cavity, now dissection was completed posteriorly and then distal 5-6 cm of the esophagus in the lower chest were circumferentially dissected, both anterior and posterior vagus nerves were identified and preserved. The gastric fundus was mobilized and short gastric vessels were divided to use it for the wrap. A cruruplasty was performed with 54 [place]ian bougie passed into the esophagus and the 3 Surgidek #0 sutures were used in figure-of-eight fashion posteriorly leaving enough space around esophagus to make sure there was no narrowing created, now fundoplication was performed ,  the gastric fundus was  passed behind the GE junction area without twisting using shoe shine maneuver and brought on the medial edge and then the left wing of the wrap was brought anteriorly thus creating a 2.5 CM long and 360 floppy wrap with 3 interrupted sutures were placed between the medial and lateral wings of the Nissen wrap, making sure no narrowing created in that area and the wings of the wrap were secured with GE junction area with interrupted Surgidek as well.  Bougie was removed.  gastroscopy was performed scope, was advanced into the stomach there was no narrowing at the GE junction area, scope passed easily through the hiatus and wrap area and then stomach was distended, there was no evidence of any air bubbles noted, the retroflexion performed showed nicely created wrap and no residual hernia was noted. The scope was removed.  Final satisfactory examination abdominal cavity was performed.  fascial defect at 12 mm port site was closed with O Vicryl sutures.Pneumoperitoneum was discontinued, retractor and trocars were removed, skin was closed with 3-0 Monocryl, Dermabond was applied. Patient tolerated the operation well, was extubated in the OR and transferred to recovery room in stable condition.    Dr. Peter was scrubbed and actively assisted in the entire operation including laparoscopic and endoscopic components.  There was no qualified resident available.      Findings: Hiatal hernia, negative leak test, endoscopic confirmation of fundoplication    Complications:  None; patient tolerated the procedure well.     Disposition: PACU - hemodynamically stable.  Condition: stable  Specimens Collected: No specimens collected  Attending Attestation: I was present and scrubbed for the entire procedure.    Elisabeth Clifton  Phone Number: [phone]

## 2024-01-04 NOTE — CARE PLAN
Problem: Pain  Goal: Takes deep breaths with improved pain control throughout the shift  Outcome: Progressing  Goal: Turns in bed with improved pain control throughout the shift  Outcome: Progressing  Goal: Walks with improved pain control throughout the shift  Outcome: Progressing  Goal: Performs ADL's with improved pain control throughout shift  Outcome: Progressing  Goal: Participates in PT with improved pain control throughout the shift  Outcome: Progressing  Goal: Free from opioid side effects throughout the shift  Outcome: Progressing  Goal: Free from acute confusion related to pain meds throughout the shift  Outcome: Progressing     Problem: Pain  Goal: My pain/discomfort is manageable  Outcome: Progressing     Problem: Safety  Goal: Patient will be injury free during hospitalization  Outcome: Progressing  Goal: I will remain free of falls  Outcome: Progressing     Problem: Daily Care  Goal: Daily care needs are met  Outcome: Progressing     Problem: Psychosocial Needs  Goal: Demonstrates ability to cope with hospitalization/illness  Outcome: Progressing  Goal: Collaborate with me, my family, and caregiver to identify my specific goals  Outcome: Progressing     Problem: Discharge Barriers  Goal: My discharge needs are met  Outcome: Progressing   The patient's goals for the shift include      The clinical goals for the shift include pain management

## 2024-01-04 NOTE — DISCHARGE SUMMARY
Discharge Diagnosis  Gastroesophageal reflux disease with esophagitis without hemorrhage    Issues Requiring Follow-Up  S/p hiatal hernia repair    Test Results Pending At Discharge  Pending Labs       No current pending labs.            Hospital Course   Patient presented for laparoscopic hiatal hernia repair. The procedure was carried out without complication. POD#0 the patient tolerated clears with well controlled nausea.  No significant events over night. On POD#1 the patient underwent esophagram which showed passage of contrast through the fundoplication and no extravasation. The patient was advanced to full liquid diet and discharged to home when tolerating this diet.      Pertinent Physical Exam At Time of Discharge  Physical Exam:   Constitutional: , no distress, alert and cooperative  Eyes: EOMI, clear sclera  Head/Neck: Neck supple, no apparent injury, No JVD, trachea midline  Respiratory/Thorax: good chest expansion, thorax symmetric  Cardiovascular: Regular heart rate  Abdominal: Incisions intact and clean, nondistended, soft, nontender   Musculoskeletal: ROM intact, no joint swelling   Extremities: Grossly normal extremities   Neurological: alert and oriented x3, intact senses  Psychological: Appropriate mood and behavior  Skin: Warm and dry, no lesions, no rashes      Home Medications     Medication List      START taking these medications     omeprazole 40 mg DR capsule; Commonly known as: PriLOSEC; Take 1 capsule   (40 mg) by mouth once daily in the morning. Take before meals. Do not   crush or chew. Open capsule, sprinkle beads on SF jello, pudding or   applesauce.   ondansetron ODT 4 mg disintegrating tablet; Commonly known as:   Zofran-ODT; Take 1 tablet (4 mg) by mouth every 6 hours if needed for   nausea or vomiting.     CONTINUE taking these medications     albuterol 90 mcg/actuation inhaler   amLODIPine 10 mg tablet; Commonly known as: Norvasc   aspirin 81 mg EC tablet   cyclobenzaprine 10 mg  tablet; Commonly known as: Flexeril   pantoprazole 40 mg EC tablet; Commonly known as: ProtoNix   sucralfate 1 gram tablet; Commonly known as: Carafate     STOP taking these medications     oxyCODONE 5 mg/5 mL solution; Commonly known as: Roxicodone       Outpatient Follow-Up  Future Appointments   Date Time Provider Department Center   1/11/2024 10:15 AM Elisabeth Clifton MD [id number] [place]       Hakeem Peter MD

## 2024-01-05 ENCOUNTER — TELEPHONE (OUTPATIENT)
Dept: SURGERY | Facility: CLINIC | Age: 54
End: 2024-01-05
Payer: COMMERCIAL

## 2024-01-05 NOTE — TELEPHONE ENCOUNTER
Outbound call to patient to check in to see how she is doing post hospital discharge. Patient states she is doing okay, walking around a lot, having some soreness.  Patient is tolerating her liquid diet, had some nausea this morning that was relieved with a dose of Zofran.  Patient is taking her PPI.  Baseline IBS-C, but will start Milk of Magnesia if she does not have a BM by Sunday.  Will follow up with patient via [employer] to provide the on call number for the weekend.

## 2024-01-05 NOTE — PROGRESS NOTES
GENERAL SURGERY CLINIC  FOLLOW UP NOTE      Name: Sonja Carrera  MRN: [id number]      Index Surgery  Date of Surgery: 1/3/24   Surgeon: Dr. Elisabeth Clifton    Surgical Procedure: Hiatal hernia repair 29610    HPI: 53 year old female presenting today for a routine 1 week post op visit.  Patient is s/p HHR with Fundoplication on 1/3/23. Progressing well.    Concerns related to:  Nausea: controlled with Zofran  Abdominal Pain: Controlled with Tylenol  Diarrhea/Constipation Baseline IBS-C         REVIEW OF SYSTEMS:  CONSTITUTIONAL: Patient denies fevers, chills, sweats and weight changes.  CARDIOVASCULAR: Patient denies chest pains, palpitations, orthopnea and paroxysmal nocturnal dyspnea.  RESPIRATORY: No dyspnea on exertion, no wheezing or cough.  GI: No nausea, vomiting, diarrhea, constipation, abdominal pain, hematochezia or melena.  MUSCULOSKELETAL: No myalgias or arthralgias.  NEUROLOGIC: No chronic headaches, no seizures. Patient denies numbness, tingling or weakness.  PSYCHIATRIC: Patient denies problems with mood disturbance. No problems with anxiety.  ENDOCRINE: No excessive urination or excessive thirst.  DERMATOLOGIC: Patient denies any rashes or skin changes.    PHYSICAL EXAM:  There were no vitals taken for this visit.  Alert, well appearing, no acute distress, nourished, hydrated.  Anicteric sclera, no ptosis  Facial symmetry  Neck supple  Unlabored respirations.  Easily conversant without increased respiratory effort  Oriented to person, place, time.  Judgement intact.  Appropriate mood, affect.   Abdomen soft, nondistended, nontender, incisions healing well, no infection    ASSESSMENT & PLAN:  53 y.o. female presenting for follow up visit s/p hiatal hernia repair with fundoplication 1/3/24    No acute post surgery complications  No acute issues or concerns presented today.  Tolerating diet   Bowel regularity baseline IBS-C    Plan:    Advance diet as advised  Advance activity as advised  No heavy lifting for  6 weeks  Continue PPI for 4 weeks      Follow-up 5 weeks

## 2024-01-05 NOTE — PATIENT INSTRUCTIONS
You are doing great!   You may shower, let soap and water run over incisions, pat dry.  Things will get easier over time.   Remember to increase your fluids and protein to goals  Do not eat and drink at the same time.   Advance your diet to full liquids/pureed foods according to the diet guidelines.   See the dietician as must as you need too.   Slowly increase your exercise and activity, as you get all your protein you will be able to have more energy.   Take your omeprazole, open the capsule and sprinkle onto applesauce. Do not swallow whole.  Follow-up in 5 weeks for your 6 week post op visit.

## 2024-01-11 ENCOUNTER — OFFICE VISIT (OUTPATIENT)
Dept: SURGERY | Facility: CLINIC | Age: 54
End: 2024-01-11
Payer: COMMERCIAL

## 2024-01-11 VITALS
HEIGHT: 63 IN | DIASTOLIC BLOOD PRESSURE: 79 MMHG | HEART RATE: 90 BPM | BODY MASS INDEX: 28.7 KG/M2 | WEIGHT: 162 LBS | TEMPERATURE: 98 F | OXYGEN SATURATION: 97 % | SYSTOLIC BLOOD PRESSURE: 132 MMHG

## 2024-01-11 DIAGNOSIS — R11.2 POST-OPERATIVE NAUSEA AND VOMITING: ICD-10-CM

## 2024-01-11 DIAGNOSIS — G89.18 POST-OP PAIN: ICD-10-CM

## 2024-01-11 DIAGNOSIS — Z98.890 POST-OPERATIVE NAUSEA AND VOMITING: ICD-10-CM

## 2024-01-11 DIAGNOSIS — K21.9 CHRONIC GASTROESOPHAGEAL REFLUX DISEASE: ICD-10-CM

## 2024-01-11 DIAGNOSIS — K44.9 HIATAL HERNIA: Primary | ICD-10-CM

## 2024-01-11 PROCEDURE — 3078F DIAST BP <80 MM HG: CPT | Performed by: SURGERY

## 2024-01-11 PROCEDURE — 3075F SYST BP GE 130 - 139MM HG: CPT | Performed by: SURGERY

## 2024-01-11 PROCEDURE — 1036F TOBACCO NON-USER: CPT | Performed by: SURGERY

## 2024-01-11 PROCEDURE — 99024 POSTOP FOLLOW-UP VISIT: CPT | Performed by: SURGERY

## 2024-02-16 ENCOUNTER — APPOINTMENT (OUTPATIENT)
Dept: SURGERY | Facility: CLINIC | Age: 54
End: 2024-02-16
Payer: COMMERCIAL

## (undated) DEVICE — RELOAD, ENDOSTITCH, SURGIDAC, 0, 48 IN, GREEN, SULU

## (undated) DEVICE — LIGASURE, L-HOOK 44CM SEALER/DIVIDER LAP, MARYLAND

## (undated) DEVICE — SYRINGE, 30 CC, LUER LOCK

## (undated) DEVICE — BINDER, ABDOMINAL, 4 PANEL, 12 X 46-62 IN

## (undated) DEVICE — TROCAR, OPTICAL, BLADELESS, KII FIOS, 5 X 100MM, THREADED

## (undated) DEVICE — KIT, LAP GASTRIC BYPASS, CUSTOM, PARMA

## (undated) DEVICE — APPLICATOR, CHLORAPREP, W/ORANGE TINT, 26ML

## (undated) DEVICE — SLEEVE, VASO PRESS, CALF GARMENT, MEDIUM, GREEN

## (undated) DEVICE — HOLSTER, JET SAFETY

## (undated) DEVICE — TUBE SET, PNEUMOLAR HEATED, SMOKE EVACU, HIGH-FLOW

## (undated) DEVICE — SLEEVE, KII, Z-THREAD, 5X100CM

## (undated) DEVICE — DRAIN, PENROSE, 0.25 X 18 IN, LATEX, STERILE

## (undated) DEVICE — MAT, AIR TRANSFER, 39X81

## (undated) DEVICE — NEEDLE, CLOSURE, OMNICLOSE

## (undated) DEVICE — TROCAR, OPTICAL, BLADELESS, 12MM, THREADED, 100MM LENGTH

## (undated) DEVICE — HOLSTER, ELECTROSURGERY ACCESSORY, STERILE

## (undated) DEVICE — CARE KIT, LAPAROSCOPIC, ADVANCED

## (undated) DEVICE — RELOAD, ENDOSTITCH, SURGIDAC, 2-0, 48 IN, GREEN, SULU

## (undated) DEVICE — RELOAD, ENDOSTITCH, POLYSORB, 2-0, 48 IN, ES9, VIOLET, SULU

## (undated) DEVICE — DEVICE, SUTURE, ENDOSTITCH, 10 MM

## (undated) DEVICE — NEEDLE, EPIDURAL 17G X 4 1/2 PERIFIX